# Patient Record
Sex: FEMALE | Race: WHITE | NOT HISPANIC OR LATINO | Employment: FULL TIME | URBAN - METROPOLITAN AREA
[De-identification: names, ages, dates, MRNs, and addresses within clinical notes are randomized per-mention and may not be internally consistent; named-entity substitution may affect disease eponyms.]

---

## 2017-01-19 ENCOUNTER — HOSPITAL ENCOUNTER (EMERGENCY)
Facility: HOSPITAL | Age: 67
Discharge: HOME | End: 2017-01-19
Attending: EMERGENCY MEDICINE | Admitting: EMERGENCY MEDICINE

## 2017-01-19 ENCOUNTER — APPOINTMENT (OUTPATIENT)
Dept: GENERAL RADIOLOGY | Facility: HOSPITAL | Age: 67
End: 2017-01-19

## 2017-01-19 VITALS
SYSTOLIC BLOOD PRESSURE: 125 MMHG | DIASTOLIC BLOOD PRESSURE: 73 MMHG | HEART RATE: 71 BPM | HEIGHT: 67 IN | OXYGEN SATURATION: 94 % | TEMPERATURE: 98.1 F | BODY MASS INDEX: 25.58 KG/M2 | WEIGHT: 163 LBS | RESPIRATION RATE: 18 BRPM

## 2017-01-19 DIAGNOSIS — I25.10 CORONARY ARTERY DISEASE INVOLVING NATIVE CORONARY ARTERY OF NATIVE HEART WITHOUT ANGINA PECTORIS: ICD-10-CM

## 2017-01-19 DIAGNOSIS — M54.6 ACUTE LEFT-SIDED THORACIC BACK PAIN: Primary | ICD-10-CM

## 2017-01-19 LAB
ALBUMIN SERPL-MCNC: 4.1 G/DL (ref 3.2–4.8)
ALBUMIN/GLOB SERPL: 1.7 G/DL (ref 1.5–2.5)
ALP SERPL-CCNC: 72 U/L (ref 25–100)
ALT SERPL W P-5'-P-CCNC: 15 U/L (ref 7–40)
ANION GAP SERPL CALCULATED.3IONS-SCNC: 9 MMOL/L (ref 3–11)
AST SERPL-CCNC: 24 U/L (ref 0–33)
BASOPHILS # BLD AUTO: 0.02 10*3/MM3 (ref 0–0.2)
BASOPHILS NFR BLD AUTO: 0.3 % (ref 0–1)
BILIRUB SERPL-MCNC: 0.3 MG/DL (ref 0.3–1.2)
BNP SERPL-MCNC: 44 PG/ML (ref 0–100)
BUN BLD-MCNC: 11 MG/DL (ref 9–23)
BUN/CREAT SERPL: 13.8 (ref 7–25)
CALCIUM SPEC-SCNC: 9.8 MG/DL (ref 8.7–10.4)
CHLORIDE SERPL-SCNC: 106 MMOL/L (ref 99–109)
CO2 SERPL-SCNC: 29 MMOL/L (ref 20–31)
CREAT BLD-MCNC: 0.8 MG/DL (ref 0.6–1.3)
DEPRECATED RDW RBC AUTO: 48.6 FL (ref 37–54)
EOSINOPHIL # BLD AUTO: 0.11 10*3/MM3 (ref 0.1–0.3)
EOSINOPHIL NFR BLD AUTO: 1.5 % (ref 0–3)
ERYTHROCYTE [DISTWIDTH] IN BLOOD BY AUTOMATED COUNT: 13.7 % (ref 11.3–14.5)
GFR SERPL CREATININE-BSD FRML MDRD: 72 ML/MIN/1.73
GLOBULIN UR ELPH-MCNC: 2.4 GM/DL
GLUCOSE BLD-MCNC: 94 MG/DL (ref 70–100)
HCT VFR BLD AUTO: 38.4 % (ref 34.5–44)
HGB BLD-MCNC: 12.8 G/DL (ref 11.5–15.5)
HOLD SPECIMEN: NORMAL
HOLD SPECIMEN: NORMAL
IMM GRANULOCYTES # BLD: 0.02 10*3/MM3 (ref 0–0.03)
IMM GRANULOCYTES NFR BLD: 0.3 % (ref 0–0.6)
LIPASE SERPL-CCNC: 23 U/L (ref 6–51)
LYMPHOCYTES # BLD AUTO: 2.42 10*3/MM3 (ref 0.6–4.8)
LYMPHOCYTES NFR BLD AUTO: 32.5 % (ref 24–44)
MCH RBC QN AUTO: 32.9 PG (ref 27–31)
MCHC RBC AUTO-ENTMCNC: 33.3 G/DL (ref 32–36)
MCV RBC AUTO: 98.7 FL (ref 80–99)
MONOCYTES # BLD AUTO: 0.31 10*3/MM3 (ref 0–1)
MONOCYTES NFR BLD AUTO: 4.2 % (ref 0–12)
NEUTROPHILS # BLD AUTO: 4.57 10*3/MM3 (ref 1.5–8.3)
NEUTROPHILS NFR BLD AUTO: 61.2 % (ref 41–71)
PLATELET # BLD AUTO: 193 10*3/MM3 (ref 150–450)
PMV BLD AUTO: 10.7 FL (ref 6–12)
POTASSIUM BLD-SCNC: 4.4 MMOL/L (ref 3.5–5.5)
PROT SERPL-MCNC: 6.5 G/DL (ref 5.7–8.2)
RBC # BLD AUTO: 3.89 10*6/MM3 (ref 3.89–5.14)
SODIUM BLD-SCNC: 144 MMOL/L (ref 132–146)
TROPONIN I SERPL-MCNC: 0 NG/ML (ref 0–0.07)
TROPONIN I SERPL-MCNC: 0 NG/ML (ref 0–0.07)
WBC NRBC COR # BLD: 7.45 10*3/MM3 (ref 3.5–10.8)
WHOLE BLOOD HOLD SPECIMEN: NORMAL
WHOLE BLOOD HOLD SPECIMEN: NORMAL

## 2017-01-19 PROCEDURE — 99285 EMERGENCY DEPT VISIT HI MDM: CPT

## 2017-01-19 PROCEDURE — 85025 COMPLETE CBC W/AUTO DIFF WBC: CPT | Performed by: EMERGENCY MEDICINE

## 2017-01-19 PROCEDURE — 36415 COLL VENOUS BLD VENIPUNCTURE: CPT

## 2017-01-19 PROCEDURE — 71010 HC CHEST PA OR AP: CPT

## 2017-01-19 PROCEDURE — 83880 ASSAY OF NATRIURETIC PEPTIDE: CPT | Performed by: EMERGENCY MEDICINE

## 2017-01-19 PROCEDURE — 83690 ASSAY OF LIPASE: CPT | Performed by: EMERGENCY MEDICINE

## 2017-01-19 PROCEDURE — 80053 COMPREHEN METABOLIC PANEL: CPT | Performed by: EMERGENCY MEDICINE

## 2017-01-19 PROCEDURE — 93005 ELECTROCARDIOGRAM TRACING: CPT | Performed by: EMERGENCY MEDICINE

## 2017-01-19 PROCEDURE — 84484 ASSAY OF TROPONIN QUANT: CPT

## 2017-01-19 RX ORDER — DICYCLOMINE HCL 20 MG
20 TABLET ORAL EVERY 6 HOURS
COMMUNITY
End: 2018-06-24

## 2017-01-19 RX ORDER — ATORVASTATIN CALCIUM 40 MG/1
TABLET, FILM COATED ORAL
COMMUNITY
Start: 2015-03-06 | End: 2018-03-28 | Stop reason: SDUPTHER

## 2017-01-19 RX ORDER — METAXALONE 800 MG/1
800 TABLET ORAL DAILY
COMMUNITY
End: 2019-04-10

## 2017-01-19 RX ORDER — ASPIRIN 81 MG/1
324 TABLET, CHEWABLE ORAL ONCE
Status: DISCONTINUED | OUTPATIENT
Start: 2017-01-19 | End: 2017-01-20 | Stop reason: HOSPADM

## 2017-01-19 RX ORDER — RANITIDINE 150 MG/1
150 TABLET ORAL 2 TIMES DAILY
COMMUNITY
End: 2018-06-24

## 2017-01-19 RX ORDER — SODIUM CHLORIDE 0.9 % (FLUSH) 0.9 %
10 SYRINGE (ML) INJECTION AS NEEDED
Status: DISCONTINUED | OUTPATIENT
Start: 2017-01-19 | End: 2017-01-20 | Stop reason: HOSPADM

## 2017-01-19 RX ORDER — CHLORAL HYDRATE 500 MG
CAPSULE ORAL
COMMUNITY
End: 2018-03-28 | Stop reason: SDUPTHER

## 2017-01-19 RX ORDER — ASPIRIN 81 MG/1
81 TABLET ORAL DAILY
COMMUNITY

## 2017-01-19 RX ORDER — PHENOL 1.4 %
600 AEROSOL, SPRAY (ML) MUCOUS MEMBRANE DAILY
COMMUNITY
End: 2020-04-09

## 2017-01-19 RX ORDER — OMEPRAZOLE 40 MG/1
40 CAPSULE, DELAYED RELEASE ORAL DAILY
COMMUNITY
End: 2018-06-24

## 2017-01-19 NOTE — ED PROVIDER NOTES
Subjective   HPI Comments: Cierra López is a 67 y.o. female w/hx MI presenting to the ED with c/o chest pain. Mrs. López reports that she had a sudden onset of left sided chest pain, radiating into the back and her left shoulder, at approximately 1530 this afternoon. She also reports feeling heavy palpitations with the onset, which she believes to be PVCs. The pain came on after she bent down to pick something up. She became lightheaded shortly after, prompting her presentation to the ED. She notes feeling warm during the episode, but had no sweats. She was given nitroglycerin and an aspirin by EMS which improved the pain. Denies any nausea, shortness of breath, fever, dysuria, or chills. No other complaints at this time.     Patient is a 67 y.o. female presenting with chest pain.   History provided by:  Patient and relative  Chest Pain   Pain location:  L chest  Pain quality: tightness    Pain radiates to:  L shoulder and upper back  Pain severity:  Moderate  Onset quality:  Sudden  Duration:  3 hours  Timing:  Constant  Progression:  Improving  Chronicity:  Recurrent  Relieved by:  Aspirin and nitroglycerin  Worsened by:  Nothing  Ineffective treatments:  None tried  Associated symptoms: back pain and palpitations    Associated symptoms: no abdominal pain, no cough, no diaphoresis, no dizziness, no fatigue, no fever, no headache, no lower extremity edema, no nausea, no numbness, no shortness of breath, no vomiting and no weakness    Risk factors: coronary artery disease        Review of Systems   Constitutional: Negative for chills, diaphoresis, fatigue and fever.   Respiratory: Negative for cough and shortness of breath.    Cardiovascular: Positive for chest pain and palpitations.   Gastrointestinal: Negative for abdominal pain, nausea and vomiting.   Genitourinary: Negative for dysuria.   Musculoskeletal: Positive for back pain.   Neurological: Positive for light-headedness. Negative for dizziness,  weakness, numbness and headaches.   All other systems reviewed and are negative.      Past Medical History   Diagnosis Date   • CAD (coronary artery disease)    • Depression    • Diverticulitis      S/P Left hemicolectomy   • Dyslipidemia    • Fatigue      daytime somnolence   • GERD (gastroesophageal reflux disease)    • Hypertension    • Palpitations      Recurrent palpitations consistent with premature atrial contractions/premature ventricular contractions. On beta blocker therapy.   • Supraventricular tachycardia      converted with adenosine       No Known Allergies    Past Surgical History   Procedure Laterality Date   • Hemicolectomy Left    • Tonsillectomy     • Appendectomy     • Hysterectomy     • Breast surgery Left      lumpectomy   • Other surgical history       bladder surgery       History reviewed. No pertinent family history.    Social History     Social History   • Marital status:      Spouse name: N/A   • Number of children: N/A   • Years of education: N/A     Social History Main Topics   • Smoking status: Former Smoker     Quit date: 2014   • Smokeless tobacco: None   • Alcohol use No   • Drug use: No   • Sexual activity: Not Asked     Other Topics Concern   • None     Social History Narrative   • None         Objective   Physical Exam   Constitutional: She is oriented to person, place, and time. She appears well-developed and well-nourished. No distress.   HENT:   Head: Normocephalic and atraumatic.   Mouth/Throat: Oropharynx is clear and moist.   Airway is patent.   Eyes: Conjunctivae and EOM are normal.   Neck: Normal range of motion. Neck supple. No JVD present.   Cardiovascular: Normal rate, regular rhythm, normal heart sounds and intact distal pulses.    Pulmonary/Chest: Effort normal and breath sounds normal. She exhibits no tenderness.   Abdominal: Soft. There is no tenderness.   Musculoskeletal: Normal range of motion. She exhibits edema (1+ pretibial left, trace pretibial  right). She exhibits no tenderness (No tenderness in the spine or paraspinal muscles).   Lymphadenopathy:     She has no cervical adenopathy.   Neurological: She is alert and oriented to person, place, and time.   Skin: Skin is warm and dry. No erythema.   Psychiatric: She has a normal mood and affect. Her behavior is normal.   Nursing note and vitals reviewed.      Procedures         ED Course  ED Course                     MDM    Final diagnoses:   Acute left-sided thoracic back pain   Coronary artery disease involving native coronary artery of native heart without angina pectoris       Documentation assistance provided by liam Ramirez.  Information recorded by the scribe was done at my direction and has been verified and validated by me.     Eric Ramirez  01/19/17 [id number]       Kevin Mancera MD  01/19/17 [id number]

## 2017-01-20 NOTE — DISCHARGE INSTRUCTIONS
Activity as desired and tolerated.  Get rechecked promptly if you start having symptoms with exertion that go away with rest, as that is your heart until proven otherwise.

## 2017-03-22 ENCOUNTER — OFFICE VISIT (OUTPATIENT)
Dept: CARDIOLOGY | Facility: CLINIC | Age: 67
End: 2017-03-22

## 2017-03-22 VITALS
DIASTOLIC BLOOD PRESSURE: 62 MMHG | HEART RATE: 84 BPM | WEIGHT: 162.6 LBS | SYSTOLIC BLOOD PRESSURE: 120 MMHG | BODY MASS INDEX: 25.52 KG/M2 | HEIGHT: 67 IN

## 2017-03-22 DIAGNOSIS — I25.10 CORONARY ARTERY DISEASE INVOLVING NATIVE CORONARY ARTERY OF NATIVE HEART WITHOUT ANGINA PECTORIS: Primary | ICD-10-CM

## 2017-03-22 DIAGNOSIS — I10 ESSENTIAL HYPERTENSION: ICD-10-CM

## 2017-03-22 DIAGNOSIS — R00.2 PALPITATIONS: ICD-10-CM

## 2017-03-22 DIAGNOSIS — E78.5 DYSLIPIDEMIA: ICD-10-CM

## 2017-03-22 DIAGNOSIS — I47.1 SUPRAVENTRICULAR TACHYCARDIA (HCC): ICD-10-CM

## 2017-03-22 PROCEDURE — 99213 OFFICE O/P EST LOW 20 MIN: CPT | Performed by: INTERNAL MEDICINE

## 2017-03-22 NOTE — PROGRESS NOTES
"Subjective:     Encounter Date:03/22/2017      Patient ID: Cierra López is a 67 y.o. female.    Chief Complaint: Hypertension; Follow-up; and Coronary Artery Disease      PROBLEM LIST:  1.  Coronary artery disease:  a. Non-ST-elevation myocardial infarction,  2013, felt secondary to SVT.  Subsequent cardiac catheterization with 20% circumflex, 40% mid RCA, distal 50% RCA, 20% LAD.  Normal LVEF.  b. July 2015, normal myocardial perfusion study.   2. History of supraventricular tachycardia, converted with adenosine.  3. Hypertension.  4. Dyslipidemia.  5. Gastroesophageal reflux disease.  6. Depression.  7.  Remote tobacco abuse with cessation in 2014.  8. Diverticulitis:  a.  Status post left hemicolectomy.  9. Tonsillectomy.  10. Appendectomy.  11.  Hysterectomy.  12. Left lumpectomy.  13. Remote bladder surgery    History of Present Illness  Patient returns today for follow up with a history of coronary artery disease.  Since her last visit, she had an emergency room visit when she developed severe left-sided and left back pain after rising from a stooping position.  This is of hypertension and PVCs.  She had a negative evaluation in the emergency room.  Her discomfort has not come back, and her hypertension has been well controlled since.  She does note occasional PVCs, some of which she does notices by feeling her pulse, and Soma which she feels with \"pounding in my neck\".  She does these occur intermittently, often made better when she takes a small amount of Xanax that she has at home.  She is a no syncope presyncope.  She is still very active working, has no difficulty walking in the parking lot at [facility], in no difficulty with exertion..     No Known Allergies      Current Outpatient Prescriptions:   •  aspirin 81 MG EC tablet, Take 81 mg by mouth Daily., Disp: , Rfl:   •  atorvastatin (LIPITOR) 40 MG tablet, Take  by mouth., Disp: , Rfl:   •  calcium carbonate (OS-MARK) 600 MG tablet, Take 600 mg by mouth " "Daily., Disp: , Rfl:   •  dicyclomine (BENTYL) 20 MG tablet, Take 20 mg by mouth Every 6 (Six) Hours., Disp: , Rfl:   •  metaxalone (SKELAXIN) 800 MG tablet, Take 800 mg by mouth 3 (Three) Times a Day As Needed for muscle spasms., Disp: , Rfl:   •  metoprolol succinate XL (TOPROL-XL) 25 MG 24 hr tablet, Take 1 tablet by mouth daily., Disp: 90 tablet, Rfl: 2  •  Multiple Vitamins-Minerals (MULTIVITAMIN ADULT PO), Take 1 tablet by mouth Daily., Disp: , Rfl:   •  Omega-3 Fatty Acids (FISH OIL) 1000 MG capsule capsule, Take  by mouth Daily With Breakfast., Disp: , Rfl:   •  omeprazole (priLOSEC) 40 MG capsule, Take 40 mg by mouth Daily., Disp: , Rfl:   •  raNITIdine (ZANTAC) 150 MG tablet, Take 150 mg by mouth 2 (Two) Times a Day., Disp: , Rfl:     The following portions of the patient's history were reviewed and updated as appropriate: allergies, current medications, past family history, past medical history, past social history, past surgical history and problem list.    Review of Systems   Constitution: Positive for malaise/fatigue.   Cardiovascular: Positive for palpitations.   Respiratory: Negative.    Hematologic/Lymphatic: Negative for bleeding problem. Does not bruise/bleed easily.   Skin: Negative for rash.   Musculoskeletal: Positive for back pain. Negative for muscle weakness and myalgias.   Gastrointestinal: Negative for heartburn, nausea and vomiting.   Neurological: Negative.           Objective:   Blood pressure 120/62, pulse 84, height 67\" (170.2 cm), weight 162 lb 9.6 oz (73.8 kg).      Physical Exam   Constitutional: She is oriented to person, place, and time. She appears well-developed and well-nourished.   HENT:   Mouth/Throat: Oropharynx is clear and moist.   Neck: No JVD present. Carotid bruit is not present. No thyromegaly present.   Cardiovascular: Regular rhythm, S1 normal, S2 normal, normal heart sounds and intact distal pulses.  Exam reveals no gallop, no S3 and no S4.    No murmur " "heard.  Pulses:       Carotid pulses are 2+ on the right side, and 2+ on the left side.       Radial pulses are 2+ on the right side, and 2+ on the left side.   Pulmonary/Chest: Breath sounds normal.   Abdominal: Soft. Bowel sounds are normal. She exhibits no mass. There is no tenderness.   Musculoskeletal: She exhibits no edema.   Neurological: She is alert and oriented to person, place, and time.   Skin: Skin is warm and dry. No rash noted.       Lab Review:    Procedures        Assessment:   Cierra was seen today for hypertension, follow-up and coronary artery disease.    Diagnoses and all orders for this visit:    Coronary artery disease involving native coronary artery of native heart without angina pectoris    Supraventricular tachycardia    Essential hypertension    Palpitations    Dyslipidemia      Impression  1. Coronary artery disease, stable without angina normal stress test less than 2 years ago  2. Palpitations, probable PVCs.  Discussed benign prognosis.  3. Hypertension controlled  4. History of SVT, no recurrence  5. Dyslipidemia on high-dose statin    Plan:  1. At this time and no change in her medical therapy.  Has recurrent episodes of \"heart pounding and throat\", she will notify us to consider monitor to capture these episodes, 2 rule out recurrent SVT.  Nonetheless her symptoms seem classic for PVCs the way she describes them.  2. Revisit in 12MO, or sooner as needed.    Cristi Jacobs MD    "

## 2017-05-26 DIAGNOSIS — Z00.00 HEALTHCARE MAINTENANCE: ICD-10-CM

## 2017-05-26 RX ORDER — METOPROLOL SUCCINATE 25 MG/1
25 TABLET, EXTENDED RELEASE ORAL DAILY
Qty: 90 TABLET | Refills: 2 | Status: SHIPPED | OUTPATIENT
Start: 2017-05-26 | End: 2018-03-05 | Stop reason: SDUPTHER

## 2018-03-05 DIAGNOSIS — Z00.00 HEALTHCARE MAINTENANCE: ICD-10-CM

## 2018-03-05 RX ORDER — METOPROLOL SUCCINATE 25 MG/1
25 TABLET, EXTENDED RELEASE ORAL DAILY
Qty: 90 TABLET | Refills: 2 | Status: SHIPPED | OUTPATIENT
Start: 2018-03-05 | End: 2018-03-28 | Stop reason: SDUPTHER

## 2018-03-26 NOTE — PROGRESS NOTES
Subjective:     Encounter Date:03/28/2018    Primary Care Physician: Cristi Lopez MD      Patient ID: Cierra López is a 68 y.o. female.    Chief Complaint:No chief complaint on file.    PROBLEM LIST:  1.  Coronary artery disease:  a. Non-ST-elevation myocardial infarction,  2013, felt secondary to SVT.  Subsequent cardiac catheterization with 20% circumflex, 40% mid RCA, distal 50% RCA, 20% LAD.  Normal LVEF.  b. July 2015, normal myocardial perfusion study.   2. History of supraventricular tachycardia, converted with adenosine.  3. Hypertension.  4. Dyslipidemia.  5. Gastroesophageal reflux disease.  6. Depression.  7.  Remote tobacco abuse with cessation in 2014.  8. Diverticulitis:  a.  Status post left hemicolectomy.  9. Tonsillectomy.  10. Appendectomy.  11.  Hysterectomy.  12. Left lumpectomy.  13. Remote bladder surgery    No Known Allergies      Current Outpatient Prescriptions:   •  aspirin 81 MG EC tablet, Take 81 mg by mouth Daily., Disp: , Rfl:   •  atorvastatin (LIPITOR) 40 MG tablet, Take  by mouth., Disp: , Rfl:   •  calcium carbonate (OS-MARK) 600 MG tablet, Take 600 mg by mouth Daily., Disp: , Rfl:   •  dicyclomine (BENTYL) 20 MG tablet, Take 20 mg by mouth Every 6 (Six) Hours., Disp: , Rfl:   •  metaxalone (SKELAXIN) 800 MG tablet, Take 800 mg by mouth 3 (Three) Times a Day As Needed for muscle spasms., Disp: , Rfl:   •  metoprolol succinate XL (TOPROL-XL) 25 MG 24 hr tablet, Take 1 tablet by mouth Daily., Disp: 90 tablet, Rfl: 2  •  Multiple Vitamins-Minerals (MULTIVITAMIN ADULT PO), Take 1 tablet by mouth Daily., Disp: , Rfl:   •  Omega-3 Fatty Acids (FISH OIL) 1000 MG capsule capsule, Take  by mouth Daily With Breakfast., Disp: , Rfl:   •  omeprazole (priLOSEC) 40 MG capsule, Take 40 mg by mouth Daily., Disp: , Rfl:   •  raNITIdine (ZANTAC) 150 MG tablet, Take 150 mg by mouth 2 (Two) Times a Day., Disp: , Rfl:         History of Present Illness    Patient returns today for annual  follow-up coronary disease.  Since her last visit she is overall doing very well.  She still working at planning on retiring next year.  No exertional dyspnea or chest pain.  No longer feels the palpitations.  Revision for copy of her living will which was scanned to chart later today.      The following portions of the patient's history were reviewed and updated as appropriate: allergies, current medications, past family history, past medical history, past social history, past surgical history and problem list.      Social History   Substance Use Topics   • Smoking status: Former Smoker     Quit date: 2014   • Smokeless tobacco: Not on file   • Alcohol use No         Review of Systems   Constitution: Positive for malaise/fatigue.   Cardiovascular: Positive for palpitations.   Respiratory: Negative.    Hematologic/Lymphatic: Negative for bleeding problem. Does not bruise/bleed easily.   Skin: Negative for rash.   Musculoskeletal: Positive for back pain. Negative for muscle weakness and myalgias.   Gastrointestinal: Negative for heartburn, nausea and vomiting.   Neurological: Negative.           Objective:    vitals were not taken for this visit.        Physical Exam   Constitutional: She is oriented to person, place, and time. She appears well-developed and well-nourished.   HENT:   Mouth/Throat: Oropharynx is clear and moist.   Neck: No JVD present. Carotid bruit is not present. No thyromegaly present.   Cardiovascular: Regular rhythm, S1 normal, S2 normal, normal heart sounds and intact distal pulses.  Exam reveals no gallop, no S3 and no S4.    No murmur heard.  Pulses:       Carotid pulses are 2+ on the right side, and 2+ on the left side.       Radial pulses are 2+ on the right side, and 2+ on the left side.   Pulmonary/Chest: Breath sounds normal.   Abdominal: Soft. Bowel sounds are normal. She exhibits no mass. There is no tenderness.   Musculoskeletal: She exhibits no edema.   Neurological: She is alert and  oriented to person, place, and time.   Skin: Skin is warm and dry. No rash noted.       Procedures          Assessment:   Assessment/Plan      Cierra was seen today for coronary artery disease.    Diagnoses and all orders for this visit:    Coronary artery disease involving native coronary artery of native heart without angina pectoris    Supraventricular tachycardia    Essential hypertension    Dyslipidemia      1.  Coronary artery disease stable without angina  2.  History of SVT, no recurrent events on beta blocker.  3.  Hypertension controlled  4.  Dyslipidemia on high-dose statin.  Recommendations continue current medical therapy.  Revisit annually or when necessary symptom change.     Cristi Jacobs MD      Dictated utilizing Dragon dictation

## 2018-03-28 ENCOUNTER — OFFICE VISIT (OUTPATIENT)
Dept: CARDIOLOGY | Facility: CLINIC | Age: 68
End: 2018-03-28

## 2018-03-28 VITALS
WEIGHT: 151 LBS | BODY MASS INDEX: 24.27 KG/M2 | DIASTOLIC BLOOD PRESSURE: 76 MMHG | HEART RATE: 77 BPM | HEIGHT: 66 IN | SYSTOLIC BLOOD PRESSURE: 132 MMHG

## 2018-03-28 DIAGNOSIS — Z00.00 HEALTHCARE MAINTENANCE: ICD-10-CM

## 2018-03-28 DIAGNOSIS — I47.1 SUPRAVENTRICULAR TACHYCARDIA (HCC): ICD-10-CM

## 2018-03-28 DIAGNOSIS — E78.5 DYSLIPIDEMIA: ICD-10-CM

## 2018-03-28 DIAGNOSIS — I10 ESSENTIAL HYPERTENSION: ICD-10-CM

## 2018-03-28 DIAGNOSIS — I25.10 CORONARY ARTERY DISEASE INVOLVING NATIVE CORONARY ARTERY OF NATIVE HEART WITHOUT ANGINA PECTORIS: Primary | ICD-10-CM

## 2018-03-28 PROCEDURE — 99213 OFFICE O/P EST LOW 20 MIN: CPT | Performed by: INTERNAL MEDICINE

## 2018-03-28 RX ORDER — METOPROLOL SUCCINATE 25 MG/1
25 TABLET, EXTENDED RELEASE ORAL DAILY
Qty: 90 TABLET | Refills: 3 | Status: SHIPPED | OUTPATIENT
Start: 2018-03-28 | End: 2018-12-04 | Stop reason: SDUPTHER

## 2018-03-28 RX ORDER — CHLORAL HYDRATE 500 MG
1000 CAPSULE ORAL
Qty: 90 CAPSULE | Refills: 3 | Status: SHIPPED | OUTPATIENT
Start: 2018-03-28 | End: 2020-04-09 | Stop reason: SDUPTHER

## 2018-03-28 RX ORDER — ATORVASTATIN CALCIUM 40 MG/1
40 TABLET, FILM COATED ORAL NIGHTLY
Qty: 90 TABLET | Refills: 3 | Status: SHIPPED | OUTPATIENT
Start: 2018-03-28 | End: 2020-04-09 | Stop reason: SDUPTHER

## 2018-06-24 ENCOUNTER — APPOINTMENT (OUTPATIENT)
Dept: GENERAL RADIOLOGY | Facility: HOSPITAL | Age: 68
End: 2018-06-24

## 2018-06-24 ENCOUNTER — HOSPITAL ENCOUNTER (OUTPATIENT)
Facility: HOSPITAL | Age: 68
Setting detail: OBSERVATION
Discharge: HOME | End: 2018-06-26
Attending: EMERGENCY MEDICINE | Admitting: INTERNAL MEDICINE

## 2018-06-24 DIAGNOSIS — Z86.79 PERSONAL HISTORY OF CORONARY ARTERY DISEASE: ICD-10-CM

## 2018-06-24 DIAGNOSIS — R00.2 HEART PALPITATIONS: ICD-10-CM

## 2018-06-24 DIAGNOSIS — I10 ELEVATED BLOOD PRESSURE READING WITH DIAGNOSIS OF HYPERTENSION: ICD-10-CM

## 2018-06-24 DIAGNOSIS — I20.0 UNSTABLE ANGINA PECTORIS (HCC): Primary | ICD-10-CM

## 2018-06-24 DIAGNOSIS — I49.3 FREQUENT PVCS: ICD-10-CM

## 2018-06-24 DIAGNOSIS — E78.5 HYPERLIPIDEMIA, UNSPECIFIED HYPERLIPIDEMIA TYPE: ICD-10-CM

## 2018-06-24 LAB
ALBUMIN SERPL-MCNC: 4.5 G/DL (ref 3.2–4.8)
ALBUMIN/GLOB SERPL: 1.7 G/DL (ref 1.5–2.5)
ALP SERPL-CCNC: 78 U/L (ref 25–100)
ALT SERPL W P-5'-P-CCNC: 14 U/L (ref 7–40)
ANION GAP SERPL CALCULATED.3IONS-SCNC: 4 MMOL/L (ref 3–11)
APTT PPP: 28.7 SECONDS (ref 55–70)
AST SERPL-CCNC: 21 U/L (ref 0–33)
BASOPHILS # BLD AUTO: 0.03 10*3/MM3 (ref 0–0.2)
BASOPHILS NFR BLD AUTO: 0.3 % (ref 0–1)
BILIRUB SERPL-MCNC: 0.5 MG/DL (ref 0.3–1.2)
BNP SERPL-MCNC: 81 PG/ML (ref 0–100)
BUN BLD-MCNC: 10 MG/DL (ref 9–23)
BUN/CREAT SERPL: 11.2 (ref 7–25)
CALCIUM SPEC-SCNC: 9.7 MG/DL (ref 8.7–10.4)
CHLORIDE SERPL-SCNC: 109 MMOL/L (ref 99–109)
CO2 SERPL-SCNC: 30 MMOL/L (ref 20–31)
CREAT BLD-MCNC: 0.89 MG/DL (ref 0.6–1.3)
DEPRECATED RDW RBC AUTO: 47.5 FL (ref 37–54)
EOSINOPHIL # BLD AUTO: 0.18 10*3/MM3 (ref 0–0.3)
EOSINOPHIL NFR BLD AUTO: 1.6 % (ref 0–3)
ERYTHROCYTE [DISTWIDTH] IN BLOOD BY AUTOMATED COUNT: 13.1 % (ref 11.3–14.5)
GFR SERPL CREATININE-BSD FRML MDRD: 63 ML/MIN/1.73
GLOBULIN UR ELPH-MCNC: 2.6 GM/DL
GLUCOSE BLD-MCNC: 108 MG/DL (ref 70–100)
HCT VFR BLD AUTO: 42.4 % (ref 34.5–44)
HGB BLD-MCNC: 13.8 G/DL (ref 11.5–15.5)
HOLD SPECIMEN: NORMAL
HOLD SPECIMEN: NORMAL
IMM GRANULOCYTES # BLD: 0.03 10*3/MM3 (ref 0–0.03)
IMM GRANULOCYTES NFR BLD: 0.3 % (ref 0–0.6)
INR PPP: 0.94 (ref 0.91–1.09)
LIPASE SERPL-CCNC: 31 U/L (ref 6–51)
LYMPHOCYTES # BLD AUTO: 3.41 10*3/MM3 (ref 0.6–4.8)
LYMPHOCYTES NFR BLD AUTO: 30.8 % (ref 24–44)
MCH RBC QN AUTO: 31.9 PG (ref 27–31)
MCHC RBC AUTO-ENTMCNC: 32.5 G/DL (ref 32–36)
MCV RBC AUTO: 97.9 FL (ref 80–99)
MONOCYTES # BLD AUTO: 0.54 10*3/MM3 (ref 0–1)
MONOCYTES NFR BLD AUTO: 4.9 % (ref 0–12)
NEUTROPHILS # BLD AUTO: 6.87 10*3/MM3 (ref 1.5–8.3)
NEUTROPHILS NFR BLD AUTO: 62.1 % (ref 41–71)
PLATELET # BLD AUTO: 282 10*3/MM3 (ref 150–450)
PMV BLD AUTO: 10.5 FL (ref 6–12)
POTASSIUM BLD-SCNC: 4.2 MMOL/L (ref 3.5–5.5)
PROT SERPL-MCNC: 7.1 G/DL (ref 5.7–8.2)
PROTHROMBIN TIME: 9.9 SECONDS (ref 9.6–11.5)
RBC # BLD AUTO: 4.33 10*6/MM3 (ref 3.89–5.14)
SODIUM BLD-SCNC: 143 MMOL/L (ref 132–146)
TROPONIN I SERPL-MCNC: 0 NG/ML (ref 0–0.07)
WBC NRBC COR # BLD: 11.06 10*3/MM3 (ref 3.5–10.8)
WHOLE BLOOD HOLD SPECIMEN: NORMAL
WHOLE BLOOD HOLD SPECIMEN: NORMAL

## 2018-06-24 PROCEDURE — 85610 PROTHROMBIN TIME: CPT | Performed by: EMERGENCY MEDICINE

## 2018-06-24 PROCEDURE — 96368 THER/DIAG CONCURRENT INF: CPT

## 2018-06-24 PROCEDURE — 84484 ASSAY OF TROPONIN QUANT: CPT

## 2018-06-24 PROCEDURE — 80053 COMPREHEN METABOLIC PANEL: CPT | Performed by: EMERGENCY MEDICINE

## 2018-06-24 PROCEDURE — 99285 EMERGENCY DEPT VISIT HI MDM: CPT

## 2018-06-24 PROCEDURE — 25010000002 HEPARIN (PORCINE) PER 1000 UNITS: Performed by: NURSE PRACTITIONER

## 2018-06-24 PROCEDURE — 83880 ASSAY OF NATRIURETIC PEPTIDE: CPT | Performed by: EMERGENCY MEDICINE

## 2018-06-24 PROCEDURE — 85025 COMPLETE CBC W/AUTO DIFF WBC: CPT | Performed by: EMERGENCY MEDICINE

## 2018-06-24 PROCEDURE — 71045 X-RAY EXAM CHEST 1 VIEW: CPT

## 2018-06-24 PROCEDURE — 96366 THER/PROPH/DIAG IV INF ADDON: CPT

## 2018-06-24 PROCEDURE — 85730 THROMBOPLASTIN TIME PARTIAL: CPT | Performed by: EMERGENCY MEDICINE

## 2018-06-24 PROCEDURE — 99220 PR INITIAL OBSERVATION CARE/DAY 70 MINUTES: CPT | Performed by: INTERNAL MEDICINE

## 2018-06-24 PROCEDURE — 96376 TX/PRO/DX INJ SAME DRUG ADON: CPT

## 2018-06-24 PROCEDURE — 93005 ELECTROCARDIOGRAM TRACING: CPT | Performed by: EMERGENCY MEDICINE

## 2018-06-24 PROCEDURE — G0378 HOSPITAL OBSERVATION PER HR: HCPCS

## 2018-06-24 PROCEDURE — 83690 ASSAY OF LIPASE: CPT | Performed by: EMERGENCY MEDICINE

## 2018-06-24 PROCEDURE — 96374 THER/PROPH/DIAG INJ IV PUSH: CPT

## 2018-06-24 PROCEDURE — 96365 THER/PROPH/DIAG IV INF INIT: CPT

## 2018-06-24 PROCEDURE — 25010000002 HEPARIN (PORCINE) PER 1000 UNITS: Performed by: EMERGENCY MEDICINE

## 2018-06-24 RX ORDER — ASPIRIN 81 MG/1
324 TABLET, CHEWABLE ORAL ONCE
Status: COMPLETED | OUTPATIENT
Start: 2018-06-24 | End: 2018-06-24

## 2018-06-24 RX ORDER — ATORVASTATIN CALCIUM 40 MG/1
40 TABLET, FILM COATED ORAL NIGHTLY
Status: DISCONTINUED | OUTPATIENT
Start: 2018-06-24 | End: 2018-06-26 | Stop reason: HOSPADM

## 2018-06-24 RX ORDER — ACETAMINOPHEN 325 MG/1
650 TABLET ORAL EVERY 4 HOURS PRN
Status: DISCONTINUED | OUTPATIENT
Start: 2018-06-24 | End: 2018-06-26 | Stop reason: HOSPADM

## 2018-06-24 RX ORDER — METOPROLOL SUCCINATE 25 MG/1
25 TABLET, EXTENDED RELEASE ORAL DAILY
Status: DISCONTINUED | OUTPATIENT
Start: 2018-06-24 | End: 2018-06-24

## 2018-06-24 RX ORDER — HEPARIN SODIUM 1000 [USP'U]/ML
4000 INJECTION, SOLUTION INTRAVENOUS; SUBCUTANEOUS ONCE
Status: COMPLETED | OUTPATIENT
Start: 2018-06-24 | End: 2018-06-24

## 2018-06-24 RX ORDER — SODIUM CHLORIDE 0.9 % (FLUSH) 0.9 %
10 SYRINGE (ML) INJECTION AS NEEDED
Status: DISCONTINUED | OUTPATIENT
Start: 2018-06-24 | End: 2018-06-26 | Stop reason: HOSPADM

## 2018-06-24 RX ORDER — HEPARIN SODIUM 5000 [USP'U]/ML
5000 INJECTION, SOLUTION INTRAVENOUS; SUBCUTANEOUS EVERY 12 HOURS SCHEDULED
Status: DISCONTINUED | OUTPATIENT
Start: 2018-06-24 | End: 2018-06-26 | Stop reason: HOSPADM

## 2018-06-24 RX ORDER — SODIUM CHLORIDE 0.9 % (FLUSH) 0.9 %
1-10 SYRINGE (ML) INJECTION AS NEEDED
Status: DISCONTINUED | OUTPATIENT
Start: 2018-06-24 | End: 2018-06-26 | Stop reason: HOSPADM

## 2018-06-24 RX ORDER — CLOPIDOGREL BISULFATE 75 MG/1
75 TABLET ORAL DAILY
Status: DISCONTINUED | OUTPATIENT
Start: 2018-06-24 | End: 2018-06-26 | Stop reason: HOSPADM

## 2018-06-24 RX ORDER — METAXALONE 800 MG/1
800 TABLET ORAL DAILY
Status: DISCONTINUED | OUTPATIENT
Start: 2018-06-24 | End: 2018-06-26 | Stop reason: HOSPADM

## 2018-06-24 RX ORDER — MULTIPLE VITAMINS W/ MINERALS TAB 9MG-400MCG
1 TAB ORAL DAILY
Status: DISCONTINUED | OUTPATIENT
Start: 2018-06-24 | End: 2018-06-26 | Stop reason: HOSPADM

## 2018-06-24 RX ORDER — ASPIRIN 81 MG/1
81 TABLET ORAL DAILY
Status: DISCONTINUED | OUTPATIENT
Start: 2018-06-25 | End: 2018-06-26 | Stop reason: HOSPADM

## 2018-06-24 RX ORDER — NITROGLYCERIN 20 MG/100ML
10-50 INJECTION INTRAVENOUS
Status: DISCONTINUED | OUTPATIENT
Start: 2018-06-24 | End: 2018-06-24 | Stop reason: ALTCHOICE

## 2018-06-24 RX ADMIN — HEPARIN SODIUM 12 UNITS/KG/HR: 10000 INJECTION, SOLUTION INTRAVENOUS at 14:42

## 2018-06-24 RX ADMIN — METAXALONE 800 MG: 800 TABLET ORAL at 17:53

## 2018-06-24 RX ADMIN — CLOPIDOGREL BISULFATE 75 MG: 75 TABLET ORAL at 18:06

## 2018-06-24 RX ADMIN — NITROGLYCERIN 10 MCG/MIN: 20 INJECTION INTRAVENOUS at 14:41

## 2018-06-24 RX ADMIN — MULTIPLE VITAMINS W/ MINERALS TAB: TAB ORAL at 17:53

## 2018-06-24 RX ADMIN — HEPARIN SODIUM 5000 UNITS: 5000 INJECTION, SOLUTION INTRAVENOUS; SUBCUTANEOUS at 21:55

## 2018-06-24 RX ADMIN — METOPROLOL SUCCINATE 25 MG: 25 TABLET, EXTENDED RELEASE ORAL at 17:53

## 2018-06-24 RX ADMIN — ASPIRIN 81 MG CHEWABLE TABLET 324 MG: 81 TABLET CHEWABLE at 14:39

## 2018-06-24 RX ADMIN — HEPARIN SODIUM 4000 UNITS: 1000 INJECTION, SOLUTION INTRAVENOUS; SUBCUTANEOUS at 14:42

## 2018-06-25 ENCOUNTER — APPOINTMENT (OUTPATIENT)
Dept: CARDIOLOGY | Facility: HOSPITAL | Age: 68
End: 2018-06-25
Attending: NURSE PRACTITIONER

## 2018-06-25 ENCOUNTER — APPOINTMENT (OUTPATIENT)
Dept: CARDIOLOGY | Facility: HOSPITAL | Age: 68
End: 2018-06-25
Attending: INTERNAL MEDICINE

## 2018-06-25 LAB
BH CV ECHO MEAS - AO MAX PG (FULL): 0.7 MMHG
BH CV ECHO MEAS - AO MAX PG: 5 MMHG
BH CV ECHO MEAS - AO ROOT AREA (BSA CORRECTED): 1.2
BH CV ECHO MEAS - AO ROOT AREA: 6.8 CM^2
BH CV ECHO MEAS - AO ROOT DIAM: 3 CM
BH CV ECHO MEAS - AO V2 MAX: 106.3 CM/SEC
BH CV ECHO MEAS - AVA(V,A): 3.3 CM^2
BH CV ECHO MEAS - AVA(V,D): 3.3 CM^2
BH CV ECHO MEAS - BSA(HAYCOCK): 2.7 M^2
BH CV ECHO MEAS - BSA: 2.5 M^2
BH CV ECHO MEAS - BZI_BMI: 50.2 KILOGRAMS/M^2
BH CV ECHO MEAS - BZI_METRIC_HEIGHT: 170 CM
BH CV ECHO MEAS - BZI_METRIC_WEIGHT: 145 KG
BH CV ECHO MEAS - CONTRAST EF (2CH): 64.8 ML/M^2
BH CV ECHO MEAS - CONTRAST EF 4CH: 68.8 ML/M^2
BH CV ECHO MEAS - EDV(CUBED): 132.4 ML
BH CV ECHO MEAS - EDV(MOD-SP2): 54 ML
BH CV ECHO MEAS - EDV(MOD-SP4): 64 ML
BH CV ECHO MEAS - EDV(TEICH): 123.6 ML
BH CV ECHO MEAS - EF(CUBED): 87.8 %
BH CV ECHO MEAS - EF(MOD-BP): 70 %
BH CV ECHO MEAS - EF(MOD-SP2): 64.8 %
BH CV ECHO MEAS - EF(MOD-SP4): 68.8 %
BH CV ECHO MEAS - EF(TEICH): 81.4 %
BH CV ECHO MEAS - ESV(CUBED): 16.2 ML
BH CV ECHO MEAS - ESV(MOD-SP2): 19 ML
BH CV ECHO MEAS - ESV(MOD-SP4): 20 ML
BH CV ECHO MEAS - ESV(TEICH): 23 ML
BH CV ECHO MEAS - FS: 50.3 %
BH CV ECHO MEAS - IVS/LVPW: 0.71
BH CV ECHO MEAS - IVSD: 0.77 CM
BH CV ECHO MEAS - LA DIMENSION: 3.5 CM
BH CV ECHO MEAS - LA/AO: 1.2
BH CV ECHO MEAS - LV DIASTOLIC VOL/BSA (35-75): 26 ML/M^2
BH CV ECHO MEAS - LV MASS(C)D: 169.3 GRAMS
BH CV ECHO MEAS - LV MASS(C)DI: 68.7 GRAMS/M^2
BH CV ECHO MEAS - LV MAX PG: 4.3 MMHG
BH CV ECHO MEAS - LV SYSTOLIC VOL/BSA (12-30): 8.1 ML/M^2
BH CV ECHO MEAS - LV V1 MAX: 103.7 CM/SEC
BH CV ECHO MEAS - LVIDD: 5.1 CM
BH CV ECHO MEAS - LVIDS: 2.5 CM
BH CV ECHO MEAS - LVLD AP2: 7.2 CM
BH CV ECHO MEAS - LVLD AP4: 6.5 CM
BH CV ECHO MEAS - LVLS AP2: 6 CM
BH CV ECHO MEAS - LVLS AP4: 5.5 CM
BH CV ECHO MEAS - LVOT AREA (M): 3.5 CM^2
BH CV ECHO MEAS - LVOT AREA: 3.3 CM^2
BH CV ECHO MEAS - LVOT DIAM: 2.1 CM
BH CV ECHO MEAS - LVPWD: 1.1 CM
BH CV ECHO MEAS - MV A MAX VEL: 59.7 CM/SEC
BH CV ECHO MEAS - MV DEC TIME: 0.3 SEC
BH CV ECHO MEAS - MV E MAX VEL: 67.1 CM/SEC
BH CV ECHO MEAS - MV E/A: 1.1
BH CV ECHO MEAS - PA ACC SLOPE: 304.5 CM/SEC^2
BH CV ECHO MEAS - PA ACC TIME: 0.25 SEC
BH CV ECHO MEAS - PA MAX PG (FULL): 3.7 MMHG
BH CV ECHO MEAS - PA MAX PG: 5.8 MMHG
BH CV ECHO MEAS - PA PR(ACCEL): -32.6 MMHG
BH CV ECHO MEAS - PA V2 MAX: 120.8 CM/SEC
BH CV ECHO MEAS - PI END-D VEL: 139.8 CM/SEC
BH CV ECHO MEAS - RV MAX PG: 2.2 MMHG
BH CV ECHO MEAS - RV V1 MAX: 73.5 CM/SEC
BH CV ECHO MEAS - SI(CUBED): 47.1 ML/M^2
BH CV ECHO MEAS - SI(MOD-SP2): 14.2 ML/M^2
BH CV ECHO MEAS - SI(MOD-SP4): 17.8 ML/M^2
BH CV ECHO MEAS - SI(TEICH): 40.8 ML/M^2
BH CV ECHO MEAS - SV(CUBED): 116.2 ML
BH CV ECHO MEAS - SV(MOD-SP2): 35 ML
BH CV ECHO MEAS - SV(MOD-SP4): 44 ML
BH CV ECHO MEAS - SV(TEICH): 100.6 ML
BH CV ECHO MEAS - TAPSE (>1.6): 2.9 CM2
BH CV STRESS BP STAGE 1: NORMAL
BH CV STRESS BP STAGE 4: NORMAL
BH CV STRESS COMMENTS STAGE 1: NORMAL
BH CV STRESS DOSE REGADENOSON STAGE 1: 0.4
BH CV STRESS DURATION MIN STAGE 1: 1
BH CV STRESS DURATION MIN STAGE 2: 1
BH CV STRESS DURATION MIN STAGE 3: 1
BH CV STRESS DURATION MIN STAGE 4: 1
BH CV STRESS DURATION SEC STAGE 2: 0
BH CV STRESS HR STAGE 1: 104
BH CV STRESS HR STAGE 2: 105
BH CV STRESS HR STAGE 3: 103
BH CV STRESS HR STAGE 4: 99
BH CV STRESS O2 STAGE 3: 97
BH CV STRESS O2 STAGE 4: 97
BH CV STRESS PROTOCOL 1: NORMAL
BH CV STRESS RECOVERY BP: NORMAL MMHG
BH CV STRESS RECOVERY HR: 90 BPM
BH CV STRESS STAGE 1: 1
BH CV STRESS STAGE 2: 2
BH CV STRESS STAGE 3: 3
BH CV STRESS STAGE 4: 4
BH CV XLRA - RV BASE: 2.5 CM
BH CV XLRA - RV LENGTH: 5.5 CM
BH CV XLRA - RV MID: 1.8 CM
BH CV XLRA - TDI S': 9.87 CM/SEC
LEFT ATRIUM VOLUME INDEX: 17.8 ML/M2
LEFT ATRIUM VOLUME: 44 CM3
LV EF 2D ECHO EST: 65 %
LV EF NUC BP: 60 %
MAXIMAL PREDICTED HEART RATE: 154 BPM
MAXIMAL PREDICTED HEART RATE: 154 BPM
PERCENT MAX PREDICTED HR: 72.73 %
STRESS BASELINE BP: NORMAL MMHG
STRESS BASELINE HR: 74 BPM
STRESS O2 SAT REST: 98 %
STRESS PERCENT HR: 86 %
STRESS POST PEAK BP: NORMAL MMHG
STRESS POST PEAK HR: 112 BPM
STRESS TARGET HR: 131 BPM
STRESS TARGET HR: 131 BPM
TROPONIN I SERPL-MCNC: <0.006 NG/ML

## 2018-06-25 PROCEDURE — 93017 CV STRESS TEST TRACING ONLY: CPT

## 2018-06-25 PROCEDURE — 0 RUBIDIUM CHLORIDE: Performed by: INTERNAL MEDICINE

## 2018-06-25 PROCEDURE — 78492 MYOCRD IMG PET MLT RST&STRS: CPT

## 2018-06-25 PROCEDURE — G0378 HOSPITAL OBSERVATION PER HR: HCPCS

## 2018-06-25 PROCEDURE — 78492 MYOCRD IMG PET MLT RST&STRS: CPT | Performed by: INTERNAL MEDICINE

## 2018-06-25 PROCEDURE — A9555 RB82 RUBIDIUM: HCPCS | Performed by: INTERNAL MEDICINE

## 2018-06-25 PROCEDURE — 99224 PR SBSQ OBSERVATION CARE/DAY 15 MINUTES: CPT | Performed by: INTERNAL MEDICINE

## 2018-06-25 PROCEDURE — 93306 TTE W/DOPPLER COMPLETE: CPT

## 2018-06-25 PROCEDURE — 93306 TTE W/DOPPLER COMPLETE: CPT | Performed by: INTERNAL MEDICINE

## 2018-06-25 PROCEDURE — 96372 THER/PROPH/DIAG INJ SC/IM: CPT

## 2018-06-25 PROCEDURE — 25010000002 REGADENOSON 0.4 MG/5ML SOLUTION: Performed by: INTERNAL MEDICINE

## 2018-06-25 PROCEDURE — 25010000002 HEPARIN (PORCINE) PER 1000 UNITS: Performed by: NURSE PRACTITIONER

## 2018-06-25 PROCEDURE — 93010 ELECTROCARDIOGRAM REPORT: CPT | Performed by: INTERNAL MEDICINE

## 2018-06-25 PROCEDURE — 93018 CV STRESS TEST I&R ONLY: CPT | Performed by: INTERNAL MEDICINE

## 2018-06-25 PROCEDURE — 84484 ASSAY OF TROPONIN QUANT: CPT | Performed by: INTERNAL MEDICINE

## 2018-06-25 PROCEDURE — 93005 ELECTROCARDIOGRAM TRACING: CPT | Performed by: INTERNAL MEDICINE

## 2018-06-25 RX ORDER — FLECAINIDE ACETATE 50 MG/1
50 TABLET ORAL EVERY 12 HOURS SCHEDULED
Status: DISCONTINUED | OUTPATIENT
Start: 2018-06-25 | End: 2018-06-26 | Stop reason: HOSPADM

## 2018-06-25 RX ADMIN — MULTIPLE VITAMINS W/ MINERALS TAB: TAB ORAL at 09:57

## 2018-06-25 RX ADMIN — CLOPIDOGREL BISULFATE 75 MG: 75 TABLET ORAL at 09:57

## 2018-06-25 RX ADMIN — REGADENOSON 0.4 MG: 0.08 INJECTION, SOLUTION INTRAVENOUS at 08:59

## 2018-06-25 RX ADMIN — RUBIDIUM CHLORIDE RB-82 1 DOSE: 150 INJECTION, SOLUTION INTRAVENOUS at 09:00

## 2018-06-25 RX ADMIN — ASPIRIN 81 MG: 81 TABLET, COATED ORAL at 09:59

## 2018-06-25 RX ADMIN — HEPARIN SODIUM 5000 UNITS: 5000 INJECTION, SOLUTION INTRAVENOUS; SUBCUTANEOUS at 09:57

## 2018-06-25 RX ADMIN — RUBIDIUM CHLORIDE RB-82 1 DOSE: 150 INJECTION, SOLUTION INTRAVENOUS at 08:50

## 2018-06-25 RX ADMIN — FLECAINIDE ACETATE 50 MG: 50 TABLET ORAL at 14:20

## 2018-06-25 RX ADMIN — ATORVASTATIN CALCIUM 40 MG: 40 TABLET, FILM COATED ORAL at 20:32

## 2018-06-25 RX ADMIN — METOPROLOL TARTRATE 25 MG: 25 TABLET ORAL at 09:58

## 2018-06-25 RX ADMIN — HEPARIN SODIUM 5000 UNITS: 5000 INJECTION, SOLUTION INTRAVENOUS; SUBCUTANEOUS at 20:32

## 2018-06-25 RX ADMIN — METAXALONE 800 MG: 800 TABLET ORAL at 09:58

## 2018-06-26 VITALS
DIASTOLIC BLOOD PRESSURE: 65 MMHG | HEIGHT: 67 IN | TEMPERATURE: 98.2 F | WEIGHT: 145.28 LBS | HEART RATE: 77 BPM | RESPIRATION RATE: 20 BRPM | OXYGEN SATURATION: 100 % | BODY MASS INDEX: 22.8 KG/M2 | SYSTOLIC BLOOD PRESSURE: 123 MMHG

## 2018-06-26 PROBLEM — I49.3 PVC'S (PREMATURE VENTRICULAR CONTRACTIONS): Status: ACTIVE | Noted: 2018-06-26

## 2018-06-26 PROCEDURE — G0378 HOSPITAL OBSERVATION PER HR: HCPCS

## 2018-06-26 PROCEDURE — 25010000002 HEPARIN (PORCINE) PER 1000 UNITS: Performed by: NURSE PRACTITIONER

## 2018-06-26 PROCEDURE — 99217 PR OBSERVATION CARE DISCHARGE MANAGEMENT: CPT | Performed by: INTERNAL MEDICINE

## 2018-06-26 PROCEDURE — 93005 ELECTROCARDIOGRAM TRACING: CPT | Performed by: INTERNAL MEDICINE

## 2018-06-26 PROCEDURE — 93010 ELECTROCARDIOGRAM REPORT: CPT | Performed by: INTERNAL MEDICINE

## 2018-06-26 PROCEDURE — 96372 THER/PROPH/DIAG INJ SC/IM: CPT

## 2018-06-26 RX ORDER — FLECAINIDE ACETATE 50 MG/1
50 TABLET ORAL EVERY 12 HOURS SCHEDULED
Qty: 60 TABLET | Refills: 11 | Status: SHIPPED | OUTPATIENT
Start: 2018-06-26 | End: 2018-07-24

## 2018-06-26 RX ADMIN — MULTIPLE VITAMINS W/ MINERALS TAB 1 TABLET: TAB ORAL at 09:11

## 2018-06-26 RX ADMIN — HEPARIN SODIUM 5000 UNITS: 5000 INJECTION, SOLUTION INTRAVENOUS; SUBCUTANEOUS at 09:11

## 2018-06-26 RX ADMIN — METOPROLOL TARTRATE 25 MG: 25 TABLET ORAL at 09:10

## 2018-06-26 RX ADMIN — CLOPIDOGREL BISULFATE 75 MG: 75 TABLET ORAL at 09:11

## 2018-06-26 RX ADMIN — ASPIRIN 81 MG: 81 TABLET, COATED ORAL at 09:11

## 2018-06-26 RX ADMIN — FLECAINIDE ACETATE 50 MG: 50 TABLET ORAL at 00:06

## 2018-06-26 RX ADMIN — FLECAINIDE ACETATE 50 MG: 50 TABLET ORAL at 09:10

## 2018-06-26 RX ADMIN — METAXALONE 800 MG: 800 TABLET ORAL at 09:11

## 2018-07-09 ENCOUNTER — TELEPHONE (OUTPATIENT)
Dept: CARDIOLOGY | Facility: CLINIC | Age: 68
End: 2018-07-09

## 2018-07-09 NOTE — TELEPHONE ENCOUNTER
Patient called in regards to short term disability through [facility], left a message to the  [employer] phone number [phone] ext [phone]. Awaiting call back.

## 2018-07-24 ENCOUNTER — OFFICE VISIT (OUTPATIENT)
Dept: CARDIOLOGY | Facility: CLINIC | Age: 68
End: 2018-07-24

## 2018-07-24 VITALS
HEART RATE: 75 BPM | WEIGHT: 146.8 LBS | DIASTOLIC BLOOD PRESSURE: 66 MMHG | SYSTOLIC BLOOD PRESSURE: 104 MMHG | BODY MASS INDEX: 23.04 KG/M2 | HEIGHT: 67 IN

## 2018-07-24 DIAGNOSIS — I49.3 PVC'S (PREMATURE VENTRICULAR CONTRACTIONS): Primary | ICD-10-CM

## 2018-07-24 DIAGNOSIS — Z79.899 LONG TERM CURRENT USE OF ANTIARRHYTHMIC MEDICAL THERAPY: ICD-10-CM

## 2018-07-24 PROCEDURE — 99214 OFFICE O/P EST MOD 30 MIN: CPT | Performed by: INTERNAL MEDICINE

## 2018-07-24 PROCEDURE — 93000 ELECTROCARDIOGRAM COMPLETE: CPT | Performed by: INTERNAL MEDICINE

## 2018-07-24 RX ORDER — FLECAINIDE ACETATE 50 MG/1
50 TABLET ORAL EVERY 12 HOURS SCHEDULED
Qty: 180 TABLET | Refills: 3 | Status: SHIPPED | OUTPATIENT
Start: 2018-07-24 | End: 2018-07-26 | Stop reason: SDUPTHER

## 2018-07-24 NOTE — PROGRESS NOTES
"    Cierra López  1950  PCP: Francis Lopez MD    SUBJECTIVE:   Cierra López is a 68 y.o. female seen for a follow up visit regarding the following:     Chief Complaint: Follow up for PVCs    HPI:    Since last visit the patient's status has been much improved. PVCs have resolved on Flecainide therapy    History:   Patient is a 68-year-old  female who follows with Dr. Jacobs for her cardiology care.  She presented to the [facility] emergency room in the June 2018 with complaints of increasing palpitations for 1 week.  She states she has been in her normal state of health when on Tuesday she noted increasing palpitations which feels similar to her PVCs that she routinely experiences, but worse in frequency/severity.  She states her palpitations improve with rest and relaxation.  They are unchanged with activity.  She also notes a mild \"burning\" sensation in her chest that is worse when she leans forward.  She states this is mild in nature and denies any associated shortness of breath, dizziness, lightheadedness, diaphoresis, or nausea.  This does not worsen with exertion.  She denies any known triggers for her increase in PVCs.  She denies any stress, anxiety, exposure to tobacco smoke, no excessive EtOH or caffeine or stimulant intake.  She is concerned with her increase in PVCs as she states her last heart attack in 2013 was precipitated by the same events.  Her last stress test in 2015 was normal with no evidence of ischemia.  Initial troponin in the emergency room is negative.  Other labs are unremarkable.     She had a cardiac work with  A Nuclear Stress test and ECHO which showed stable findings. She was started on Flecainide and the PVCs resolved.     Cardiac PMH: (Old records have been reviewed and summarized below)  1.  Coronary artery disease:  a. Non-ST-elevation myocardial infarction,  2013, felt secondary to SVT.  Subsequent cardiac catheterization with 20% " circumflex, 40% mid RCA, distal 50% RCA, 20% LAD.  Normal LVEF.  b. July 2015, normal myocardial perfusion study.   2. History of supraventricular tachycardia, converted with adenosine.  3. Hypertension.  4. Dyslipidemia.  5. Gastroesophageal reflux disease.  6. Depression.  7.  Remote tobacco abuse with cessation in 2014.  8. Diverticulitis:  a.  Status post left hemicolectomy.  9. Tonsillectomy.  10. Appendectomy.  11.  Hysterectomy.  12. Left lumpectomy.  13. Remote bladder surgery    Current Outpatient Prescriptions:   •  aspirin 81 MG EC tablet, Take 81 mg by mouth Daily., Disp: , Rfl:   •  atorvastatin (LIPITOR) 40 MG tablet, Take 1 tablet by mouth Every Night., Disp: 90 tablet, Rfl: 3  •  calcium carbonate (OS-MARK) 600 MG tablet, Take 600 mg by mouth Daily., Disp: , Rfl:   •  flecainide (TAMBOCOR) 50 MG tablet, Take 1 tablet by mouth Every 12 (Twelve) Hours., Disp: 60 tablet, Rfl: 11  •  metaxalone (SKELAXIN) 800 MG tablet, Take 800 mg by mouth Daily., Disp: , Rfl:   •  metoprolol succinate XL (TOPROL-XL) 25 MG 24 hr tablet, Take 1 tablet by mouth Daily., Disp: 90 tablet, Rfl: 3  •  Multiple Vitamins-Minerals (MULTIVITAMIN ADULT PO), Take 1 tablet by mouth Daily., Disp: , Rfl:   •  Omega-3 Fatty Acids (FISH OIL) 1000 MG capsule capsule, Take 1 capsule by mouth Daily With Breakfast., Disp: 90 capsule, Rfl: 3    Past Medical History, Past Surgical History, Family history, Social History, and Medications were all reviewed with the patient today and updated as necessary.       Patient Active Problem List   Diagnosis   • CAD (coronary artery disease)   • Supraventricular tachycardia (CMS/HCC)   • Hypertension   • Dyslipidemia   • GERD (gastroesophageal reflux disease)   • Depression   • Diverticulitis   • Palpitations   • PVC's (premature ventricular contractions)   • Long term current use of antiarrhythmic medical therapy     No Known Allergies  Past Medical History:   Diagnosis Date   • CAD (coronary artery  "disease)    • Depression    • Diverticulitis     S/P Left hemicolectomy   • Dyslipidemia    • Fatigue     daytime somnolence   • GERD (gastroesophageal reflux disease)    • Hypertension    • Palpitations     Recurrent palpitations consistent with premature atrial contractions/premature ventricular contractions. On beta blocker therapy.   • Supraventricular tachycardia (CMS/HCC)     converted with adenosine     Past Surgical History:   Procedure Laterality Date   • APPENDECTOMY     • BREAST SURGERY Left     lumpectomy   • HEMICOLECTOMY Left    • HYSTERECTOMY     • OTHER SURGICAL HISTORY      bladder surgery   • TONSILLECTOMY       Family History   Problem Relation Age of Onset   • Heart disease Mother      Social History   Substance Use Topics   • Smoking status: Former Smoker     Quit date: 2014   • Smokeless tobacco: Never Used   • Alcohol use No         PHYSICAL EXAM:    /66 (BP Location: Left arm, Patient Position: Sitting)   Pulse 75   Ht 170.2 cm (67\")   Wt 66.6 kg (146 lb 12.8 oz)   BMI 22.99 kg/m²        Wt Readings from Last 5 Encounters:   07/24/18 66.6 kg (146 lb 12.8 oz)   06/25/18 65.9 kg (145 lb 4.5 oz)   03/28/18 68.5 kg (151 lb)   03/22/17 73.8 kg (162 lb 9.6 oz)   01/19/17 73.9 kg (163 lb)       BP Readings from Last 5 Encounters:   07/24/18 104/66   06/26/18 123/65   03/28/18 132/76   03/22/17 120/62   01/19/17 125/73       General-Well Nourished, Well developed  Eyes - PERRLA  Neck- supple, No mass  CV- regular rate and rhythm, no MRG, No edema  Lung- clear bilaterally  Abd- soft, +BS  Musc/skel - Norm strength and range of motion  Skin- warm and dry  Neuro - Alert & Oriented x 3, appropriate mood.        Medical problems and test results were reviewed with the patient today.     No results found for this or any previous visit (from the past 672 hour(s)).      EKG: (EKG has been independently visualized by me and summarized below)      ECG 12 Lead  Date/Time: 7/24/2018 12:01 PM  Performed " by: JV CERVANTES  Authorized by: JV CERVANTES   Previous ECG: no previous ECG available  Rhythm: sinus rhythm  Rate: normal  BPM: 75  Conduction: conduction normal  ST Segments: ST segments normal  T Waves: T waves normal  QRS axis: normal  Clinical impression: normal ECG             ASSESSMENT and PLAN  1.  PVCs-started on flecainide therapy. Much improved and have resolved on Flecainide. Will follow up with EP service if palp return.   2. Use of Flecainide - Monitor EKG    Follow up with Dr. Jacobs      Return if symptoms worsen or fail to improve.        Jv Cervantes M.D., F.A.C.C, F.H.R.S.  Cardiology/Electrophysiology  7/24/2018  12:04 PM

## 2018-07-25 ENCOUNTER — TELEPHONE (OUTPATIENT)
Dept: CARDIOLOGY | Facility: CLINIC | Age: 68
End: 2018-07-25

## 2018-07-25 NOTE — TELEPHONE ENCOUNTER
Pt called and left a voicemail to tell us her pharmacy [facility] did not receive her refill. Also that she needs her FMLA paperwork she just turned into our office yesterday. I called the pt back and left her a message telling her I need to know the medication name that she needs refills on and that the [place] paperwork is on Divya's desk to be signed.

## 2018-07-26 RX ORDER — FLECAINIDE ACETATE 50 MG/1
50 TABLET ORAL EVERY 12 HOURS SCHEDULED
Qty: 180 TABLET | Refills: 3 | Status: SHIPPED | OUTPATIENT
Start: 2018-07-26 | End: 2020-04-09 | Stop reason: SDUPTHER

## 2018-12-04 DIAGNOSIS — Z00.00 HEALTHCARE MAINTENANCE: ICD-10-CM

## 2018-12-04 RX ORDER — METOPROLOL SUCCINATE 25 MG/1
25 TABLET, EXTENDED RELEASE ORAL DAILY
Qty: 90 TABLET | Refills: 3 | Status: SHIPPED | OUTPATIENT
Start: 2018-12-04 | End: 2020-04-09 | Stop reason: SDUPTHER

## 2019-04-01 NOTE — PROGRESS NOTES
Subjective:     Encounter Date:04/10/2019    Primary Care Physician: Francis Lopez MD      Patient ID: Cierra López is a 69 y.o. female.    Chief Complaint:Follow-up    PROBLEM LIST:  1.  Coronary artery disease:  a. Non-ST-elevation myocardial infarction,  2013, felt secondary to SVT.  Subsequent cardiac catheterization with 20% circumflex, 40% mid RCA, distal 50% RCA, 20% LAD.  Normal LVEF.  b. July 2015, normal myocardial perfusion study.   2. History of supraventricular tachycardia, converted with adenosine.  3. PVC  a. On flecainide per EP Dr. Cervantes  4. Hypertension.  5. Dyslipidemia.  6. Gastroesophageal reflux disease.  7. Depression.  8.  Remote tobacco abuse with cessation in 2014.  9. Diverticulitis:  a.  Status post left hemicolectomy.  10. Tonsillectomy.  11. Appendectomy.  12.  Hysterectomy.  13. Left lumpectomy.  14. Remote bladder surgery        No Known Allergies      Current Outpatient Medications:   •  aspirin 81 MG EC tablet, Take 81 mg by mouth Daily., Disp: , Rfl:   •  atorvastatin (LIPITOR) 40 MG tablet, Take 1 tablet by mouth Every Night., Disp: 90 tablet, Rfl: 3  •  calcium carbonate (OS-MARK) 600 MG tablet, Take 600 mg by mouth Daily., Disp: , Rfl:   •  cephalexin (KEFLEX) 500 MG capsule, Take 500 mg by mouth 3 (Three) Times a Day., Disp: , Rfl:   •  flecainide (TAMBOCOR) 50 MG tablet, Take 1 tablet by mouth Every 12 (Twelve) Hours., Disp: 180 tablet, Rfl: 3  •  metoprolol succinate XL (TOPROL-XL) 25 MG 24 hr tablet, Take 1 tablet by mouth Daily., Disp: 90 tablet, Rfl: 3  •  Multiple Vitamins-Minerals (MULTIVITAMIN ADULT PO), Take 1 tablet by mouth Daily., Disp: , Rfl:   •  naproxen (NAPROSYN) 500 MG tablet, Take 500 mg by mouth 2 (Two) Times a Day As Needed for Mild Pain ., Disp: , Rfl:   •  Omega-3 Fatty Acids (FISH OIL) 1000 MG capsule capsule, Take 1 capsule by mouth Daily With Breakfast., Disp: 90 capsule, Rfl: 3        History of Present Illness    Patient returns today  "for annual follow-up of coronary disease and PVCs.  Since last being seen she notes to overall be doing well.  Back in the summer she presented to the emergency department with frequent PVCs which she was symptomatic with.  She was evaluated by her electrophysiologist and started on flecainide therapy.  Since that time she notes to be doing very well.  She has not had recurrent palpitations.  She denies any chest pain, pressure, tightness.  She denies any increasing shortness of breath.  No syncope, near syncope, or edema.  She states that she is compliant with her medications.    The following portions of the patient's history were reviewed and updated as appropriate: allergies, current medications, past family history, past medical history, past social history, past surgical history and problem list.      Social History     Tobacco Use   • Smoking status: Former Smoker     Last attempt to quit:      Years since quittin.2   • Smokeless tobacco: Never Used   Substance Use Topics   • Alcohol use: No   • Drug use: No         Review of Systems   Constitution: Positive for chills.   Cardiovascular: Negative.    Respiratory: Positive for sputum production.    Hematologic/Lymphatic: Negative for bleeding problem. Does not bruise/bleed easily.   Skin: Positive for dry skin and itching. Negative for rash.   Musculoskeletal: Negative for muscle weakness and myalgias.   Gastrointestinal: Negative for heartburn, nausea and vomiting.   Neurological: Positive for excessive daytime sleepiness.          Objective:    height is 170.2 cm (67\") and weight is 65.8 kg (145 lb). Her blood pressure is 120/68 and her pulse is 59.         Physical Exam      ECG 12 Lead  Date/Time: 4/10/2019 1:04 PM  Performed by: Evi Shepard APRN  Authorized by: Evi Shepard APRN   Comparison: compared with previous ECG from 2018  Similar to previous ECG  Rhythm: sinus bradycardia  Comments: Otherwise within normal " limits.                  Assessment:   Assessment/Plan      Cierra was seen today for follow-up.    Diagnoses and all orders for this visit:    Coronary artery disease involving native coronary artery of native heart without angina pectoris    PVC's (premature ventricular contractions), stable on current medical therapy.    Dyslipidemia, on statin therapy.  Labs with primary care.    Essential hypertension, controlled.    Other orders  -     ECG 12 Lead      Plan:  1. Currently we will continue flecainide for her PVCs as this is being well tolerated.  2. Continue statin for dyslipidemia and previous history of coronary disease.  3. Continue aspirin and beta-blocker therapy for coronary disease as well.  4. Follow-up in 1 years time or sooner if needed.       Evi VIRGEN     Dictated utilizing Dragon dictation

## 2019-04-10 ENCOUNTER — OFFICE VISIT (OUTPATIENT)
Dept: CARDIOLOGY | Facility: CLINIC | Age: 69
End: 2019-04-10

## 2019-04-10 VITALS
BODY MASS INDEX: 22.76 KG/M2 | WEIGHT: 145 LBS | DIASTOLIC BLOOD PRESSURE: 68 MMHG | HEIGHT: 67 IN | SYSTOLIC BLOOD PRESSURE: 120 MMHG | HEART RATE: 59 BPM

## 2019-04-10 DIAGNOSIS — I49.3 PVC'S (PREMATURE VENTRICULAR CONTRACTIONS): ICD-10-CM

## 2019-04-10 DIAGNOSIS — E78.5 DYSLIPIDEMIA: ICD-10-CM

## 2019-04-10 DIAGNOSIS — I10 ESSENTIAL HYPERTENSION: ICD-10-CM

## 2019-04-10 DIAGNOSIS — I25.10 CORONARY ARTERY DISEASE INVOLVING NATIVE CORONARY ARTERY OF NATIVE HEART WITHOUT ANGINA PECTORIS: Primary | ICD-10-CM

## 2019-04-10 PROCEDURE — 99214 OFFICE O/P EST MOD 30 MIN: CPT | Performed by: NURSE PRACTITIONER

## 2019-04-10 PROCEDURE — 93000 ELECTROCARDIOGRAM COMPLETE: CPT | Performed by: NURSE PRACTITIONER

## 2019-04-10 RX ORDER — CEPHALEXIN 500 MG/1
500 CAPSULE ORAL 3 TIMES DAILY
COMMUNITY
End: 2020-04-09

## 2019-04-10 RX ORDER — NAPROXEN 500 MG/1
500 TABLET ORAL 2 TIMES DAILY PRN
COMMUNITY
End: 2020-04-09

## 2020-04-08 NOTE — PROGRESS NOTES
[facility]  Subjective:     Encounter Date:04/09/2020      Patient ID: Cirera López is a 70 y.o. female.    Chief Complaint: Coronary Artery Disease      PROBLEM LIST:  1.  Coronary artery disease:  a. [facility], 2013 for NSTEMI, felt secondary to SVT: 20% circumflex, 40% mid RCA, distal 50% RCA, 20% LAD.  Normal LVEF.  b. MPS, July 2015: Normal.  c. MPS, 6/25/2018: No evidence of ischemia. EF at rest 42% and 60% with stress.   2. History of supraventricular tachycardia, converted with adenosine.  3. PVC  a. On flecainide per EP Dr. Helen garvey. Echo, 6/25/2018: EF 65%, frequent PVC's during exam. There is no outflow obstruction or significant valvular disease.  c. MPS, 6/25/2018: PVC's noted in [place] pattern at rest, became less frequent post Lexiscan.  4. Hypertension.  5. Dyslipidemia.  6. GERD  7. Depression.  8. Remote tobacco abuse with cessation in 2014.  9. Diverticulitis:  a.  Status post left hemicolectomy.  10. Surgical history:   a. Tonsillectomy.  b. Appendectomy.  c. Hysterectomy.  d. Left lumpectomy.  e. Remote bladder surgery  f. Cataract       History of Present Illness  Cierra López has a telephone visit today for a 1 year follow up with a history of CAD, PVC, and cardiac risk factors. Since last visit since last visit, patient overall think she is feeling well.  She had cataract surgery.  She has no exertional chest pain dyspnea or claudication denies orthopnea PND or at.  She has rare occasional palpitations much less than she had prior, and is thinking the flecainide is working well.  Her only interval full medical problem was bright red blood per rectum, occurring last week for which he saw Dr. Lopez.  Given her previous scopes only some AVMs, it was decided that she did not need another scope.  She was started on Prilosec and had her aspirin discontinued and she notes her bleeding has stopped.  She is scheduled to resume her aspirin next  "week.    No Known Allergies      Current Outpatient Medications:   •  aspirin 81 MG EC tablet, Take 81 mg by mouth Daily., Disp: , Rfl:   •  atorvastatin (LIPITOR) 40 MG tablet, Take 1 tablet by mouth Every Night., Disp: 90 tablet, Rfl: 3  •  calcium-vitamin D (OSCAL) 250-125 MG-UNIT per tablet, Take 1 tablet by mouth Daily., Disp: , Rfl:   •  flecainide (TAMBOCOR) 50 MG tablet, Take 1 tablet by mouth Every 12 (Twelve) Hours., Disp: 180 tablet, Rfl: 3  •  metoprolol succinate XL (TOPROL-XL) 25 MG 24 hr tablet, Take 1 tablet by mouth Daily., Disp: 90 tablet, Rfl: 3  •  Multiple Vitamins-Minerals (MULTIVITAMIN ADULT PO), Take 1 tablet by mouth Daily., Disp: , Rfl:   •  Omega-3 Fatty Acids (FISH OIL) 1000 MG capsule capsule, Take 1 capsule by mouth Daily With Breakfast., Disp: 90 capsule, Rfl: 3  •  omeprazole (priLOSEC) 20 MG capsule, Take 20 mg by mouth Daily., Disp: , Rfl:     The following portions of the patient's history were reviewed and updated as appropriate: allergies, current medications, past family history, past medical history, past social history, past surgical history and problem list.    Review of Systems   Constitution: Negative.   Cardiovascular: Negative.    Respiratory: Negative.    Hematologic/Lymphatic: Negative for bleeding problem. Does not bruise/bleed easily.   Skin: Negative for rash.   Musculoskeletal: Negative for muscle weakness and myalgias.   Gastrointestinal: Positive for change in bowel habit. Negative for heartburn, nausea and vomiting.   Neurological: Negative.           Objective:     Vitals:    04/09/20 1353   Weight: 64.9 kg (143 lb)   Height: 170.2 cm (67\")         Physical Exam   No physical exam performed secondary to telephone visit.      Lab Review:  03/26/2019  Lipid Panel:      HDL 44  LDL 84    Procedures        Assessment:   Cierra was seen today for coronary artery disease.    Diagnoses and all orders for this visit:    Coronary artery disease involving " native coronary artery of native heart without angina pectoris    Supraventricular tachycardia (CMS/HCC)    PVC's (premature ventricular contractions)    Essential hypertension        Plan:  1. Coronary artery disease, stable without angina.  2. Resume aspirin next week.  81 mg.  3. Continue flecainide for PVCs.  4. Dyslipidemia last LDL 84 will have fasting lipids rechecked by primary care physician once the pandemic precautions have been lifted  5. Continue current medications.  6. Revisit in 12 MO, or sooner as needed.    This patient has consented to a telehealth visit via telephone. The visit was scheduled as a telephone visit to comply with patient safety concerns in accordance with CDC recommendations.  All vitals recorded within this visit are reported by the patient.  I spent 15 minutes in total including but not limited to the 8 minutes spent in direct conversation with this patient.        Cristi Jacobs MD      Please note that portions of this note may have been completed with a voice recognition program. Efforts were made to edit the dictations, but occasionally words are mistranscribed.

## 2020-04-09 ENCOUNTER — OFFICE VISIT (OUTPATIENT)
Dept: CARDIOLOGY | Facility: CLINIC | Age: 70
End: 2020-04-09

## 2020-04-09 VITALS — HEIGHT: 67 IN | WEIGHT: 143 LBS | BODY MASS INDEX: 22.44 KG/M2

## 2020-04-09 DIAGNOSIS — I47.1 SUPRAVENTRICULAR TACHYCARDIA (HCC): ICD-10-CM

## 2020-04-09 DIAGNOSIS — I25.10 CORONARY ARTERY DISEASE INVOLVING NATIVE CORONARY ARTERY OF NATIVE HEART WITHOUT ANGINA PECTORIS: Primary | ICD-10-CM

## 2020-04-09 DIAGNOSIS — I49.3 PVC'S (PREMATURE VENTRICULAR CONTRACTIONS): ICD-10-CM

## 2020-04-09 DIAGNOSIS — Z00.00 HEALTHCARE MAINTENANCE: ICD-10-CM

## 2020-04-09 DIAGNOSIS — I10 ESSENTIAL HYPERTENSION: ICD-10-CM

## 2020-04-09 PROCEDURE — 99441 PR PHYS/QHP TELEPHONE EVALUATION 5-10 MIN: CPT | Performed by: INTERNAL MEDICINE

## 2020-04-09 RX ORDER — METOPROLOL SUCCINATE 25 MG/1
25 TABLET, EXTENDED RELEASE ORAL DAILY
Qty: 90 TABLET | Refills: 3 | Status: SHIPPED | OUTPATIENT
Start: 2020-04-09 | End: 2021-05-19

## 2020-04-09 RX ORDER — CHLORAL HYDRATE 500 MG
1000 CAPSULE ORAL
Qty: 90 CAPSULE | Refills: 3 | Status: SHIPPED | OUTPATIENT
Start: 2020-04-09

## 2020-04-09 RX ORDER — OMEPRAZOLE 20 MG/1
20 CAPSULE, DELAYED RELEASE ORAL DAILY
COMMUNITY
End: 2021-04-14

## 2020-04-09 RX ORDER — FLECAINIDE ACETATE 50 MG/1
50 TABLET ORAL EVERY 12 HOURS SCHEDULED
Qty: 180 TABLET | Refills: 3 | Status: SHIPPED | OUTPATIENT
Start: 2020-04-09 | End: 2021-05-19

## 2020-04-09 RX ORDER — ATORVASTATIN CALCIUM 40 MG/1
40 TABLET, FILM COATED ORAL NIGHTLY
Qty: 90 TABLET | Refills: 3 | Status: SHIPPED | OUTPATIENT
Start: 2020-04-09 | End: 2021-05-19

## 2021-04-14 ENCOUNTER — OFFICE VISIT (OUTPATIENT)
Dept: CARDIOLOGY | Facility: CLINIC | Age: 71
End: 2021-04-14

## 2021-04-14 VITALS
WEIGHT: 138 LBS | BODY MASS INDEX: 21.66 KG/M2 | HEART RATE: 76 BPM | HEIGHT: 67 IN | SYSTOLIC BLOOD PRESSURE: 118 MMHG | DIASTOLIC BLOOD PRESSURE: 70 MMHG

## 2021-04-14 DIAGNOSIS — I25.10 CORONARY ARTERY DISEASE INVOLVING NATIVE CORONARY ARTERY OF NATIVE HEART WITHOUT ANGINA PECTORIS: Primary | ICD-10-CM

## 2021-04-14 DIAGNOSIS — I47.1 SUPRAVENTRICULAR TACHYCARDIA (HCC): ICD-10-CM

## 2021-04-14 DIAGNOSIS — I10 ESSENTIAL HYPERTENSION: ICD-10-CM

## 2021-04-14 DIAGNOSIS — I25.10 CORONARY ARTERY DISEASE INVOLVING NATIVE CORONARY ARTERY OF NATIVE HEART, ANGINA PRESENCE UNSPECIFIED: Primary | ICD-10-CM

## 2021-04-14 DIAGNOSIS — E78.5 DYSLIPIDEMIA: ICD-10-CM

## 2021-04-14 PROCEDURE — 99214 OFFICE O/P EST MOD 30 MIN: CPT | Performed by: INTERNAL MEDICINE

## 2021-04-14 NOTE — PROGRESS NOTES
Subjective:     Encounter Date:04/14/2021    Primary Care Physician: Francis Lopez MD      Patient ID: Cierra López is a 71 y.o. female.    Chief Complaint:Annual Exam    PROBLEM LIST:  1.  Coronary artery disease:  a. [facility], 2013 for NSTEMI, felt secondary to SVT: 20% circumflex, 40% mid RCA, distal 50% RCA, 20% LAD.  Normal LVEF.  b. MPS, July 2015: Normal.  c. MPS, 6/25/2018: No evidence of ischemia. EF at rest 42% and 60% with stress.   2. History of supraventricular tachycardia, converted with adenosine.  3. PVC  a. On flecainide per EP Dr. Helen garvey. Echo, 6/25/2018: EF 65%, frequent PVC's during exam. There is no outflow obstruction or significant valvular disease.  c. MPS, 6/25/2018: PVC's noted in [place] pattern at rest, became less frequent post Lexiscan.  4. Hypertension.  5. Dyslipidemia.  6. GERD  7. Depression.  8. Remote tobacco abuse with cessation in 2014.  9. Diverticulitis:  a.  Status post left hemicolectomy.  10. Surgical history:   a. Tonsillectomy.  b. Appendectomy.  c. Hysterectomy.  d. Left lumpectomy.  e. Remote bladder surgery  f. Cataract      No Known Allergies      Current Outpatient Medications:   •  aspirin 81 MG EC tablet, Take 81 mg by mouth Daily., Disp: , Rfl:   •  atorvastatin (LIPITOR) 40 MG tablet, Take 1 tablet by mouth Every Night., Disp: 90 tablet, Rfl: 3  •  calcium-vitamin D (OSCAL) 250-125 MG-UNIT per tablet, Take 1 tablet by mouth Daily., Disp: , Rfl:   •  flecainide (TAMBOCOR) 50 MG tablet, Take 1 tablet by mouth Every 12 (Twelve) Hours., Disp: 180 tablet, Rfl: 3  •  metoprolol succinate XL (TOPROL-XL) 25 MG 24 hr tablet, Take 1 tablet by mouth Daily., Disp: 90 tablet, Rfl: 3  •  Multiple Vitamins-Minerals (MULTIVITAMIN ADULT PO), Take 1 tablet by mouth Daily., Disp: , Rfl:   •  Omega-3 Fatty Acids (FISH OIL) 1000 MG capsule capsule, Take 1 capsule by mouth Daily With Breakfast., Disp: 90 capsule, Rfl: 3        History of Present Illness    Patient  "returns today for annual follow-up of coronary artery disease, and SVT.  Since her last visit she overall is doing very well she is active and asymptomatic.  No cardiovascular complaints.    The following portions of the patient's history were reviewed and updated as appropriate: allergies, current medications, past family history, past medical history, past social history, past surgical history and problem list.    Social History     Tobacco Use   • Smoking status: Former Smoker     Quit date:      Years since quittin.2   • Smokeless tobacco: Never Used   Substance Use Topics   • Alcohol use: No   • Drug use: No         ROS       Objective:   /70   Pulse 76   Ht 170.2 cm (67\")   Wt 62.6 kg (138 lb)   BMI 21.61 kg/m²         Vitals reviewed.   Constitutional:       Appearance: Well-developed and not in distress.   Neck:      Thyroid: No thyromegaly.      Vascular: No carotid bruit or JVD.   Pulmonary:      Breath sounds: Normal breath sounds.   Cardiovascular:      Regular rhythm.      No gallop. No S3 and S4 gallop.   Abdominal:      General: Bowel sounds are normal.      Palpations: Abdomen is soft. There is no abdominal mass.      Tenderness: There is no abdominal tenderness.   Musculoskeletal:         General: No deformity.      Extremities: No clubbing present.Skin:     General: Skin is warm and dry.      Findings: No rash.   Neurological:      Mental Status: Alert and oriented to person, place, and time.         Procedures          Assessment:   Assessment/Plan      Diagnoses and all orders for this visit:    1. Coronary artery disease involving native coronary artery of native heart without angina pectoris (Primary)    2. Supraventricular tachycardia (CMS/HCC)    3. Essential hypertension    4. Dyslipidemia      1.  Coronary artery disease, nonflow ablated by cath 7 years ago normal perfusion study 3 years ago.  No current angina  2.  Dyslipidemia last LDL of 94 on high-dose statin.  3.  " Hypertension well-controlled on metoprolol  4.  SVT no recurrence on metoprolol and flecainide.    Recommendations  1.  Continue current medical therapy  2.  Given the fact she is on flecainide with moderate CAD, she will need an ischemic evaluation prior to next visit.  3.  Discussed dietary changes to decrease her LDL.  Would not dose her statin at this time but does have several follow-up with her primary care physician.  4.  Revisit annually apparent symptom change            Dictated utilizing Dragon dictation

## 2021-05-19 DIAGNOSIS — Z00.00 HEALTHCARE MAINTENANCE: ICD-10-CM

## 2021-05-19 RX ORDER — ATORVASTATIN CALCIUM 40 MG/1
TABLET, FILM COATED ORAL
Qty: 90 TABLET | Refills: 3 | Status: SHIPPED | OUTPATIENT
Start: 2021-05-19 | End: 2022-05-19 | Stop reason: SDUPTHER

## 2021-05-19 RX ORDER — FLECAINIDE ACETATE 50 MG/1
TABLET ORAL
Qty: 180 TABLET | Refills: 3 | Status: SHIPPED | OUTPATIENT
Start: 2021-05-19 | End: 2022-05-19 | Stop reason: SDUPTHER

## 2021-05-19 RX ORDER — METOPROLOL SUCCINATE 25 MG/1
TABLET, EXTENDED RELEASE ORAL
Qty: 90 TABLET | Refills: 3 | Status: SHIPPED | OUTPATIENT
Start: 2021-05-19 | End: 2022-05-19 | Stop reason: SDUPTHER

## 2021-12-03 DIAGNOSIS — I25.10 CORONARY ARTERY DISEASE INVOLVING NATIVE CORONARY ARTERY OF NATIVE HEART WITHOUT ANGINA PECTORIS: Primary | ICD-10-CM

## 2021-12-03 DIAGNOSIS — I47.1 SUPRAVENTRICULAR TACHYCARDIA (HCC): ICD-10-CM

## 2021-12-03 DIAGNOSIS — R00.2 PALPITATIONS: ICD-10-CM

## 2022-04-18 ENCOUNTER — HOSPITAL ENCOUNTER (OUTPATIENT)
Dept: CARDIOLOGY | Facility: HOSPITAL | Age: 72
Discharge: HOME | End: 2022-04-18
Admitting: INTERNAL MEDICINE

## 2022-04-18 VITALS
DIASTOLIC BLOOD PRESSURE: 72 MMHG | WEIGHT: 138 LBS | SYSTOLIC BLOOD PRESSURE: 160 MMHG | HEART RATE: 81 BPM | BODY MASS INDEX: 21.66 KG/M2 | HEIGHT: 67 IN

## 2022-04-18 DIAGNOSIS — I47.1 SUPRAVENTRICULAR TACHYCARDIA: ICD-10-CM

## 2022-04-18 DIAGNOSIS — R00.2 PALPITATIONS: ICD-10-CM

## 2022-04-18 DIAGNOSIS — I25.10 CORONARY ARTERY DISEASE INVOLVING NATIVE CORONARY ARTERY OF NATIVE HEART WITHOUT ANGINA PECTORIS: ICD-10-CM

## 2022-04-18 PROCEDURE — 93017 CV STRESS TEST TRACING ONLY: CPT

## 2022-04-18 PROCEDURE — 0 TECHNETIUM SESTAMIBI: Performed by: INTERNAL MEDICINE

## 2022-04-18 PROCEDURE — 78452 HT MUSCLE IMAGE SPECT MULT: CPT

## 2022-04-18 PROCEDURE — 78452 HT MUSCLE IMAGE SPECT MULT: CPT | Performed by: INTERNAL MEDICINE

## 2022-04-18 PROCEDURE — A9500 TC99M SESTAMIBI: HCPCS | Performed by: INTERNAL MEDICINE

## 2022-04-18 PROCEDURE — 93018 CV STRESS TEST I&R ONLY: CPT | Performed by: INTERNAL MEDICINE

## 2022-04-18 RX ADMIN — TECHNETIUM TC 99M SESTAMIBI 1 DOSE: 1 INJECTION INTRAVENOUS at 10:20

## 2022-04-18 RX ADMIN — TECHNETIUM TC 99M SESTAMIBI 1 DOSE: 1 INJECTION INTRAVENOUS at 12:40

## 2022-04-19 LAB
BH CV REST NUCLEAR ISOTOPE DOSE: 9.5 MCI
BH CV STRESS BP STAGE 1: NORMAL
BH CV STRESS BP STAGE 2: NORMAL
BH CV STRESS DURATION MIN STAGE 1: 3
BH CV STRESS DURATION MIN STAGE 2: 2
BH CV STRESS DURATION SEC STAGE 1: 0
BH CV STRESS DURATION SEC STAGE 2: 50
BH CV STRESS GRADE STAGE 1: 10
BH CV STRESS GRADE STAGE 2: 12
BH CV STRESS HR STAGE 1: 146
BH CV STRESS HR STAGE 2: 169
BH CV STRESS METS STAGE 1: 5
BH CV STRESS METS STAGE 2: 7.5
BH CV STRESS NUCLEAR ISOTOPE DOSE: 30.4 MCI
BH CV STRESS O2 STAGE 1: 98
BH CV STRESS O2 STAGE 2: 97
BH CV STRESS PROTOCOL 1: NORMAL
BH CV STRESS RECOVERY BP: NORMAL MMHG
BH CV STRESS RECOVERY HR: 109 BPM
BH CV STRESS RECOVERY O2: 99 %
BH CV STRESS SPEED STAGE 1: 1.7
BH CV STRESS SPEED STAGE 2: 2.5
BH CV STRESS STAGE 1: 1
BH CV STRESS STAGE 2: 2
LV EF NUC BP: 81 %
MAXIMAL PREDICTED HEART RATE: 150 BPM
PERCENT MAX PREDICTED HR: 114 %
STRESS BASELINE BP: NORMAL MMHG
STRESS BASELINE HR: 85 BPM
STRESS O2 SAT REST: 99 %
STRESS PERCENT HR: 134 %
STRESS POST ESTIMATED WORKLOAD: 7 METS
STRESS POST EXERCISE DUR MIN: 5 MIN
STRESS POST EXERCISE DUR SEC: 50 SEC
STRESS POST O2 SAT PEAK: 97 %
STRESS POST PEAK BP: NORMAL MMHG
STRESS POST PEAK HR: 171 BPM
STRESS TARGET HR: 128 BPM

## 2022-04-20 ENCOUNTER — OFFICE VISIT (OUTPATIENT)
Dept: CARDIOLOGY | Facility: CLINIC | Age: 72
End: 2022-04-20

## 2022-04-20 VITALS
WEIGHT: 124 LBS | HEIGHT: 67 IN | OXYGEN SATURATION: 98 % | HEART RATE: 81 BPM | RESPIRATION RATE: 18 BRPM | DIASTOLIC BLOOD PRESSURE: 64 MMHG | BODY MASS INDEX: 19.46 KG/M2 | SYSTOLIC BLOOD PRESSURE: 108 MMHG

## 2022-04-20 DIAGNOSIS — I10 PRIMARY HYPERTENSION: ICD-10-CM

## 2022-04-20 DIAGNOSIS — E78.5 DYSLIPIDEMIA: ICD-10-CM

## 2022-04-20 DIAGNOSIS — I49.3 PVC'S (PREMATURE VENTRICULAR CONTRACTIONS): ICD-10-CM

## 2022-04-20 DIAGNOSIS — I25.10 CORONARY ARTERY DISEASE INVOLVING NATIVE CORONARY ARTERY OF NATIVE HEART WITHOUT ANGINA PECTORIS: Primary | ICD-10-CM

## 2022-04-20 PROCEDURE — 99213 OFFICE O/P EST LOW 20 MIN: CPT | Performed by: INTERNAL MEDICINE

## 2022-04-20 NOTE — PROGRESS NOTES
Subjective:     Encounter Date:04/20/2022    Primary Care Physician: Francis Lopez MD      Patient ID: Cierra López is a 72 y.o. female.    Chief Complaint:Follow-up and Coronary Artery Disease      PROBLEM LIST:  1.  Coronary artery disease:  a. [facility], 2013 for NSTEMI, felt secondary to SVT: 20% circumflex, 40% mid RCA, distal 50% RCA, 20% LAD.  Normal LVEF.  b. MPS, July 2015: Normal.  c. MPS, 6/25/2018: No evidence of ischemia. EF at rest 42% and 60% with stress.   d. 4/18/2022 MPS negative for ischemia.  EF greater than 70%.  2. History of supraventricular tachycardia, converted with adenosine.  3. PVC  a. On flecainide per EP Dr. Helen garvey. Echo, 6/25/2018: EF 65%, frequent PVC's during exam. There is no outflow obstruction or significant valvular disease.  c. MPS, 4/19/2022.  Normal  4. Hypertension.  5. Dyslipidemia.  6. GERD  7. Depression.  8. Remote tobacco abuse with cessation in 2014.  9. Diverticulitis:  a.  Status post left hemicolectomy.  10. Surgical history:   a. Tonsillectomy.  b. Appendectomy.  c. Hysterectomy.  d. Left lumpectomy.  e. Remote bladder surgery  f. Cataract      No Known Allergies      Current Outpatient Medications:   •  aspirin 81 MG EC tablet, Take 81 mg by mouth Daily., Disp: , Rfl:   •  atorvastatin (LIPITOR) 40 MG tablet, TAKE 1 TABLET BY MOUTH EVERY DAY AT NIGHT, Disp: 90 tablet, Rfl: 3  •  calcium-vitamin D (OSCAL) 250-125 MG-UNIT per tablet, Take 1 tablet by mouth Daily., Disp: , Rfl:   •  flecainide (TAMBOCOR) 50 MG tablet, TAKE 1 TABLET BY MOUTH EVERY 12 HOURS, Disp: 180 tablet, Rfl: 3  •  metoprolol succinate XL (TOPROL-XL) 25 MG 24 hr tablet, TAKE 1 TABLET BY MOUTH EVERY DAY, Disp: 90 tablet, Rfl: 3  •  Multiple Vitamins-Minerals (MULTIVITAMIN ADULT PO), Take 1 tablet by mouth Daily., Disp: , Rfl:   •  Omega-3 Fatty Acids (FISH OIL) 1000 MG capsule capsule, Take 1 capsule by mouth Daily With Breakfast., Disp: 90 capsule, Rfl: 3        History of Present  "Illness    Patient returns today for annual follow-up of coronary artery disease PVCs hypertension and dyslipidemia.  Since her next visit she overall is doing very well.  She has no cardiovascular plaints.  Underwent a stress test last week which was normal.  She is very active doing yard work and take care of her disabled dogs.  Notes rare occasional palpitations are infrequent and not bothersome to her.  Her main issue is the cost increased from her insurance plan for her flecainide.    The following portions of the patient's history were reviewed and updated as appropriate: allergies, current medications, past family history, past medical history, past social history, past surgical history and problem list.      Social History     Tobacco Use   • Smoking status: Former Smoker     Quit date:      Years since quittin.3   • Smokeless tobacco: Never Used   Substance Use Topics   • Alcohol use: No   • Drug use: No         ROS       Objective:   /64   Pulse 81   Resp 18   Ht 170.2 cm (67\")   Wt 56.2 kg (124 lb)   SpO2 98%   BMI 19.42 kg/m²         Vitals reviewed.   Constitutional:       Appearance: Well-developed and not in distress.   Neck:      Thyroid: No thyromegaly.      Vascular: No carotid bruit or JVD.   Pulmonary:      Breath sounds: Normal breath sounds.   Cardiovascular:      Regular rhythm.      No gallop. No S3 and S4 gallop.   Abdominal:      General: Bowel sounds are normal.      Palpations: Abdomen is soft. There is no abdominal mass.      Tenderness: There is no abdominal tenderness.   Musculoskeletal:         General: No deformity.      Extremities: No clubbing present.Skin:     General: Skin is warm and dry.      Findings: No rash.   Neurological:      Mental Status: Alert and oriented to person, place, and time.         Procedures          Assessment:   Assessment/Plan      Diagnoses and all orders for this visit:    1. Coronary artery disease involving native coronary artery " of native heart without angina pectoris (Primary)    2. Dyslipidemia    3. Primary hypertension    4. PVC's (premature ventricular contractions)      1.  Coronary artery disease, no angina.  Normal stress perfusion last week  2.  History of PVCs controlled on flecainide (placed by previous electrophysiologist) issues with cost, but is working well.  3.  Hypertension well-controlled on metoprolol  4.  Dyslipidemia on high intensity statin    Recommendations:  1.  Continue current medical therapy  2.  Offered patient perhaps a trial of another agent due to the cost issues.  As this 1 is working well, she is willing to simply continue at the higher co-pay.  3.  Revisit annually apparent symptom change    Cristi Jacobs MD             Dictated utilizing Dragon dictation

## 2022-05-19 DIAGNOSIS — Z00.00 HEALTHCARE MAINTENANCE: ICD-10-CM

## 2022-05-19 RX ORDER — FLECAINIDE ACETATE 50 MG/1
50 TABLET ORAL EVERY 12 HOURS
Qty: 180 TABLET | Refills: 3 | Status: SHIPPED | OUTPATIENT
Start: 2022-05-19 | End: 2022-06-29

## 2022-05-19 RX ORDER — ATORVASTATIN CALCIUM 40 MG/1
40 TABLET, FILM COATED ORAL
Qty: 90 TABLET | Refills: 3 | Status: SHIPPED | OUTPATIENT
Start: 2022-05-19

## 2022-05-19 RX ORDER — METOPROLOL SUCCINATE 25 MG/1
25 TABLET, EXTENDED RELEASE ORAL DAILY
Qty: 90 TABLET | Refills: 3 | Status: SHIPPED | OUTPATIENT
Start: 2022-05-19

## 2022-05-31 ENCOUNTER — TELEPHONE (OUTPATIENT)
Dept: CARDIOLOGY | Facility: CLINIC | Age: 72
End: 2022-05-31

## 2022-05-31 NOTE — TELEPHONE ENCOUNTER
Spoke with patient, she has a holter on currently ordered by Dr. Lopez, advised will have  call with next available.

## 2022-06-29 ENCOUNTER — OFFICE VISIT (OUTPATIENT)
Dept: CARDIOLOGY | Facility: CLINIC | Age: 72
End: 2022-06-29

## 2022-06-29 VITALS
SYSTOLIC BLOOD PRESSURE: 102 MMHG | OXYGEN SATURATION: 98 % | BODY MASS INDEX: 21.21 KG/M2 | WEIGHT: 132 LBS | HEART RATE: 68 BPM | DIASTOLIC BLOOD PRESSURE: 60 MMHG | HEIGHT: 66 IN

## 2022-06-29 DIAGNOSIS — I47.1 SUPRAVENTRICULAR TACHYCARDIA: ICD-10-CM

## 2022-06-29 DIAGNOSIS — E78.5 DYSLIPIDEMIA: ICD-10-CM

## 2022-06-29 DIAGNOSIS — I49.3 PVC'S (PREMATURE VENTRICULAR CONTRACTIONS): ICD-10-CM

## 2022-06-29 DIAGNOSIS — I25.10 CORONARY ARTERY DISEASE INVOLVING NATIVE CORONARY ARTERY OF NATIVE HEART WITHOUT ANGINA PECTORIS: Primary | ICD-10-CM

## 2022-06-29 PROCEDURE — 99214 OFFICE O/P EST MOD 30 MIN: CPT | Performed by: INTERNAL MEDICINE

## 2022-06-29 PROCEDURE — 93000 ELECTROCARDIOGRAM COMPLETE: CPT | Performed by: INTERNAL MEDICINE

## 2022-06-29 NOTE — PROGRESS NOTES
"Subjective:     Encounter Date:06/29/2022      Patient ID: Cierra López is a 72 y.o. female.    Chief Complaint: Follow-up, Dizziness, and Fall        PROBLEM LIST:  1.  Coronary artery disease:  a. [facility], 2013 for NSTEMI, felt secondary to SVT: 20% circumflex, 40% mid RCA, distal 50% RCA, 20% LAD.  Normal LVEF.  b. MPS, July 2015: Normal.  c. MPS, 6/25/2018: No evidence of ischemia. EF at rest 42% and 60% with stress.   d. 4/18/2022 MPS negative for ischemia.  EF greater than 70%.  2. History of supraventricular tachycardia, converted with adenosine.  3. PVC  a. On flecainide per EP Dr. Helen garvey. Echo, 6/25/2018: EF 65%, frequent PVC's during exam. There is no outflow obstruction or significant valvular disease.  c. MPS, 4/19/2022.  Normal  4. Hypertension.  5. Dyslipidemia.  6. GERD  7. Depression.  8. Remote tobacco abuse with cessation in 2014.  9. Diverticulitis:  a.  Status post left hemicolectomy.  10. Surgical history:   a. Tonsillectomy.  b. Appendectomy.  c. Hysterectomy.  d. Left lumpectomy.  e. Remote bladder surgery  f. Cataract  1.     History of Present Illness  Patient returns today for follow up with a history of PVCs SVT and nonflow obstructive coronary disease returns today for recurrent dizziness falls \"brain fog and I fall\".  Patient notes for \"quite some time\", likely years she has had progressive brain fog, and an eye visual changes.  She has not had some troubles with falling from this.  She thinks this started with her flecainide, and has progressively been worse.  She saw her primary physician who decreased her flecainide dose and one half, she notes her symptoms have improved though not resolved note.  She wore a monitor which per patient report looked okay, though the results we do not have yet.  She still has persistent symptoms.  Denies any presyncope or syncope.  Does have falls Vascepa bends over.  No chest pain or dyspnea on exertion.    No Known Allergies      Current " "Outpatient Medications:   •  aspirin 81 MG EC tablet, Take 81 mg by mouth Daily., Disp: , Rfl:   •  atorvastatin (LIPITOR) 40 MG tablet, Take 1 tablet by mouth every night at bedtime., Disp: 90 tablet, Rfl: 3  •  calcium-vitamin D (OSCAL) 250-125 MG-UNIT per tablet, Take 1 tablet by mouth Daily., Disp: , Rfl:   •  flecainide (TAMBOCOR) 50 MG tablet, Take 1 tablet by mouth Every 12 (Twelve) Hours. (Patient taking differently: Take 50 mg by mouth Daily.), Disp: 180 tablet, Rfl: 3  •  metoprolol succinate XL (TOPROL-XL) 25 MG 24 hr tablet, Take 1 tablet by mouth Daily., Disp: 90 tablet, Rfl: 3  •  Multiple Vitamins-Minerals (MULTIVITAMIN ADULT PO), Take 1 tablet by mouth Daily., Disp: , Rfl:   •  Omega-3 Fatty Acids (FISH OIL) 1000 MG capsule capsule, Take 1 capsule by mouth Daily With Breakfast., Disp: 90 capsule, Rfl: 3    The following portions of the patient's history were reviewed and updated as appropriate: allergies, current medications, past family history, past medical history, past social history, past surgical history and problem list.    ROS       Objective:   Blood pressure 102/60, pulse 68, height 167.6 cm (66\"), weight 59.9 kg (132 lb), SpO2 98 %.      Vitals reviewed.   Constitutional:       Appearance: Well-developed and not in distress.   Neck:      Thyroid: No thyromegaly.      Vascular: No carotid bruit or JVD.   Pulmonary:      Breath sounds: Normal breath sounds.   Cardiovascular:      Regular rhythm.      No gallop. No S3 and S4 gallop.   Abdominal:      General: Bowel sounds are normal.      Palpations: Abdomen is soft. There is no abdominal mass.      Tenderness: There is no abdominal tenderness.   Musculoskeletal:         General: No deformity.      Extremities: No clubbing present.Skin:     General: Skin is warm and dry.      Findings: No rash.   Neurological:      Mental Status: Alert and oriented to person, place, and time.         Lab Review:      ECG 12 Lead    Date/Time: 6/29/2022 10:44 " "AM  Performed by: Cristi Jacobs MD  Authorized by: Cristi Jacobs MD   Comparison: compared with previous ECG from 4/14/2021  Similar to previous ECG  Rhythm: sinus rhythm    Clinical impression: normal ECG                Assessment:   Diagnoses and all orders for this visit:    1. Coronary artery disease involving native coronary artery of native heart without angina pectoris (Primary)    2. Dyslipidemia    3. PVC's (premature ventricular contractions)    4. Supraventricular tachycardia (HCC)    Other orders  -     ECG 12 Lead        Impression  1. 1.  Nonflow obstructive CAD.  No angina  2. 2.  \"Brain fog\" falls dizziness, does not appear to be orthostatic by history.  3. History of SVT, 2013 presenting with non-STEMI.  No documented recurrence  4. Frequent PVCs for which she is on flecainide.  Patient thinks symptoms started with this, and is improved with decreasing dose.  5. 5.  Dyslipidemia on high intensity statin well-controlled    Plan:  11. Discontinue flecainide  12. If symptoms do not resolve, may need to decrease dose of beta-blocker given relative hypotension see if this helps improve symptoms  13. Discussed with patient, if she develops recurrent SVT and/or highly symptomatic PVCs, given that she is intolerant to the above medications, she would be best served by EP/ablative therapies.  14. Revisit in 4 MO, or sooner as needed.    Cristi Jacobs MD    "

## 2022-11-15 NOTE — PROGRESS NOTES
Subjective:     Encounter Date:11/16/2022    Primary Care Physician: Francis Lopez MD      Patient ID: Cierra López is a 72 y.o. female.    Chief Complaint:Follow-up and Cardiomyopathy    PROBLEM LIST:  1.  Coronary artery disease:  a. [facility], 2013 for NSTEMI, felt secondary to SVT: 20% circumflex, 40% mid RCA, distal 50% RCA, 20% LAD.  Normal LVEF.  b. MPS, July 2015: Normal.  c. MPS, 6/25/2018: No evidence of ischemia. EF at rest 42% and 60% with stress.   d. 4/18/2022 MPS negative for ischemia.  EF greater than 70%.  2. History of supraventricular tachycardia, converted with adenosine.  3. PVC  a. On flecainide per EP Dr. Helen garvey. Echo, 6/25/2018: EF 65%, frequent PVC's during exam. There is no outflow obstruction or significant valvular disease.  c. MPS, 4/19/2022.  Normal  4. Hypertension.  5. Dyslipidemia.  6. Lung cancer, stage IV.  Adenocarcinoma  1. Diagnosed 8/2022.  2. Followed at [facility] at   7. GERD  8. Depression.  9. Remote tobacco abuse with cessation in 2014.  10. Diverticulitis:  a.  Status post left hemicolectomy.  11. Surgical history:   a. Tonsillectomy.  b. Appendectomy.  c. Hysterectomy.  d. Left lumpectomy.  e. Remote bladder surgery  f. Cataract      No Known Allergies      Current Outpatient Medications:   •  aspirin 81 MG EC tablet, Take 81 mg by mouth Daily., Disp: , Rfl:   •  atorvastatin (LIPITOR) 40 MG tablet, Take 1 tablet by mouth every night at bedtime., Disp: 90 tablet, Rfl: 3  •  calcium-vitamin D (OSCAL) 250-125 MG-UNIT per tablet, Take 1 tablet by mouth Daily., Disp: , Rfl:   •  metoprolol succinate XL (TOPROL-XL) 25 MG 24 hr tablet, Take 1 tablet by mouth Daily., Disp: 90 tablet, Rfl: 3  •  Multiple Vitamins-Minerals (MULTIVITAMIN ADULT PO), Take 1 tablet by mouth Daily., Disp: , Rfl:   •  Omega-3 Fatty Acids (FISH OIL) 1000 MG capsule capsule, Take 1 capsule by mouth Daily With Breakfast., Disp: 90 capsule, Rfl: 3        History of Present  "Illness    Patient is a 72-year-old  female who is being seen today for follow-up of coronary artery disease, history of SVT, and PVCs.  At last office visit patient's flecainide was discontinued.  Since that time she notes to be doing better from that standpoint.  However unfortunately patient was diagnosed with stage IV lung cancer in August.  She is currently proceeding with chemotherapy and has 2 more rounds left.  She has lost roughly 20 pounds by our records since her last visit.  Overall, notes to be doing well and is in good spirits.  Denies any current cardiac complaints.    The following portions of the patient's history were reviewed and updated as appropriate: allergies, current medications, past family history, past medical history, past social history, past surgical history and problem list.      Social History     Tobacco Use   • Smoking status: Former     Types: Cigarettes     Quit date:      Years since quittin.8   • Smokeless tobacco: Never   Substance Use Topics   • Alcohol use: No   • Drug use: No         Review of Systems   Constitutional: Positive for malaise/fatigue.   Cardiovascular: Negative for chest pain, dyspnea on exertion, leg swelling, palpitations and syncope.   Respiratory: Negative.  Negative for shortness of breath.    Hematologic/Lymphatic: Negative for bleeding problem. Does not bruise/bleed easily.   Skin: Negative for rash.   Musculoskeletal: Positive for arthritis. Negative for muscle weakness and myalgias.   Gastrointestinal: Negative for heartburn, nausea and vomiting.   Neurological: Negative for dizziness, light-headedness, loss of balance and numbness.          Objective:   /58   Pulse 83   Ht 170.2 cm (67\")   Wt 51.1 kg (112 lb 9.6 oz)   SpO2 97%   BMI 17.64 kg/m²         Vitals reviewed.   Constitutional:       Appearance: Well-developed and not in distress.   Neck:      Vascular: No JVD.      Trachea: No tracheal deviation.   Pulmonary:      " Effort: Pulmonary effort is normal.      Breath sounds: Normal breath sounds.   Cardiovascular:      Normal rate. Regular rhythm.   Edema:     Peripheral edema absent.   Abdominal:      Palpations: Abdomen is soft.      Tenderness: There is no abdominal tenderness.   Musculoskeletal:         General: No deformity. Skin:     General: Skin is warm and dry.   Neurological:      Mental Status: Alert and oriented to person, place, and time.         Procedures          Assessment:   Assessment & Plan      Diagnoses and all orders for this visit:    1. Coronary artery disease involving native coronary artery of native heart without angina pectoris (Primary).  Stable.  No current angina.  On aspirin.    2. Dyslipidemia, stable.  On statin.  Labs with primary care.    3. PVC's (premature ventricular contractions), stable.  On beta-blocker.    4. Supraventricular tachycardia (HCC), stable.  On beta-blocker.      Plan:  1. Continue current cardiac medications.  2. Patient overall stable from cardiac standpoint.  3. Follow-up in 1 years time or sooner if needed.       Evi VIRGEN     Dictated utilizing Dragon dictation

## 2022-11-16 ENCOUNTER — OFFICE VISIT (OUTPATIENT)
Dept: CARDIOLOGY | Facility: CLINIC | Age: 72
End: 2022-11-16

## 2022-11-16 VITALS
OXYGEN SATURATION: 97 % | WEIGHT: 112.6 LBS | SYSTOLIC BLOOD PRESSURE: 115 MMHG | HEIGHT: 67 IN | HEART RATE: 83 BPM | DIASTOLIC BLOOD PRESSURE: 58 MMHG | BODY MASS INDEX: 17.67 KG/M2

## 2022-11-16 DIAGNOSIS — I49.3 PVC'S (PREMATURE VENTRICULAR CONTRACTIONS): ICD-10-CM

## 2022-11-16 DIAGNOSIS — I25.10 CORONARY ARTERY DISEASE INVOLVING NATIVE CORONARY ARTERY OF NATIVE HEART WITHOUT ANGINA PECTORIS: Primary | ICD-10-CM

## 2022-11-16 DIAGNOSIS — E78.5 DYSLIPIDEMIA: ICD-10-CM

## 2022-11-16 DIAGNOSIS — I47.1 SUPRAVENTRICULAR TACHYCARDIA: ICD-10-CM

## 2022-11-16 PROCEDURE — 99214 OFFICE O/P EST MOD 30 MIN: CPT | Performed by: NURSE PRACTITIONER

## 2023-11-28 NOTE — PROGRESS NOTES
Subjective:     Encounter Date:11/29/2023    Primary Care Physician: Francis Lopez MD      Patient ID: Cierra López is a 73 y.o. female.    Chief Complaint:No chief complaint on file.      PROBLEM LIST:   Coronary artery disease:  [facility], 2013 for NSTEMI, felt secondary to SVT: 20% circumflex, 40% mid RCA, distal 50% RCA, 20% LAD.  Normal LVEF.  MPS, July 2015: Normal.  MPS, 6/25/2018: No evidence of ischemia. EF at rest 42% and 60% with stress.   4/18/2022 MPS negative for ischemia.  EF greater than 70%.  History of supraventricular tachycardia, converted with adenosine.  PVC  On flecainide per EP Dr. Cervantes  Echo, 6/25/2018: EF 65%, frequent PVC's during exam. There is no outflow obstruction or significant valvular disease.  MPS, 4/19/2022.  Normal  Hypertension.  Dyslipidemia.  Lung cancer, stage IV.  Adenocarcinoma  Diagnosed 8/2022.  Followed at [facility] at   GERD  Depression.  Remote tobacco abuse with cessation in 2014.  Diverticulitis:   Status post left hemicolectomy.  Surgical history:   Tonsillectomy.  Appendectomy.  Hysterectomy.  Left lumpectomy.  Remote bladder surgery  Cataract        No Known Allergies      Current Outpatient Medications:     aspirin 81 MG EC tablet, Take 81 mg by mouth Daily., Disp: , Rfl:     atorvastatin (LIPITOR) 40 MG tablet, Take 1 tablet by mouth every night at bedtime., Disp: 90 tablet, Rfl: 3    calcium-vitamin D (OSCAL) 250-125 MG-UNIT per tablet, Take 1 tablet by mouth Daily., Disp: , Rfl:     metoprolol succinate XL (TOPROL-XL) 25 MG 24 hr tablet, Take 1 tablet by mouth Daily., Disp: 90 tablet, Rfl: 3    Multiple Vitamins-Minerals (MULTIVITAMIN ADULT PO), Take 1 tablet by mouth Daily., Disp: , Rfl:     Omega-3 Fatty Acids (FISH OIL) 1000 MG capsule capsule, Take 1 capsule by mouth Daily With Breakfast., Disp: 90 capsule, Rfl: 3        History of Present Illness    Patient returns today for routine follow-up coronary disease.  Since her last visit, she  has had progression of her stage IV adenocarcinoma, and is currently on hospice.  She has continue to lose weight, has poor appetite, but is in good spirits.  Denies chest pain orthopnea PND or peripheral edema.  No syncope or presyncope.    The following portions of the patient's history were reviewed and updated as appropriate: allergies, current medications, past family history, past medical history, past social history, past surgical history and problem list.      Social History     Tobacco Use    Smoking status: Former     Types: Cigarettes     Quit date:      Years since quittin.9    Smokeless tobacco: Never   Substance Use Topics    Alcohol use: No    Drug use: No         ROS       Objective:   There were no vitals taken for this visit.        Vitals reviewed.   Constitutional:       Appearance: Well-developed and not in distress. Cachectic and frail.   Neck:      Thyroid: No thyromegaly.      Vascular: No carotid bruit or JVD.   Pulmonary:      Breath sounds: Normal breath sounds.   Cardiovascular:      Regular rhythm.      No gallop. No S3 and S4 gallop.   Abdominal:      General: Bowel sounds are normal.      Palpations: Abdomen is soft. There is no abdominal mass.      Tenderness: There is no abdominal tenderness.   Musculoskeletal:         General: No deformity.      Extremities: No clubbing present.Skin:     General: Skin is warm and dry.      Findings: No rash.   Neurological:      Mental Status: Alert and oriented to person, place, and time.         Procedures          Assessment:   Assessment & Plan      Diagnoses and all orders for this visit:    1. Coronary artery disease involving native coronary artery of native heart without angina pectoris (Primary)      1.  Coronary artery disease, status post remote PCI's.  Currently stable without angina.  2.  Stage IV adenocarcinoma, progressive.  3.  Hypertension controlled, on beta-blocker  4.  History of SVT, and frequent PVCs, controlled on  beta-blocker  5.  Dyslipidemia unclear if she still on statin at this time.    Recommendations:  1.  Discussed with patient, given her terminal illness, and severe cachexia, that she has no dietary restrictions, and she may discontinue her atorvastatin.  2.  Her blood pressure is okay despite the weight loss, and we will continue her metoprolol to help prevent recurrent symptomatic palpitations/SVT as she had in the past.  She understands if she develops some dizziness or lightheadedness we may be able to discontinue this.  3.  Revisit with us on a as needed basis.         Advance Care Planning   ACP discussion was held with the patient during this visit. Patient has an advance directive in EMR which is still valid.       Cristi Jacobs MD    Dictated utilizing Dragon dictation

## 2023-11-29 ENCOUNTER — OFFICE VISIT (OUTPATIENT)
Dept: CARDIOLOGY | Facility: CLINIC | Age: 73
End: 2023-11-29
Payer: COMMERCIAL

## 2023-11-29 VITALS
DIASTOLIC BLOOD PRESSURE: 86 MMHG | WEIGHT: 90.8 LBS | OXYGEN SATURATION: 96 % | BODY MASS INDEX: 14.25 KG/M2 | HEIGHT: 67 IN | HEART RATE: 120 BPM | SYSTOLIC BLOOD PRESSURE: 136 MMHG

## 2023-11-29 DIAGNOSIS — I25.10 CORONARY ARTERY DISEASE INVOLVING NATIVE CORONARY ARTERY OF NATIVE HEART WITHOUT ANGINA PECTORIS: Primary | ICD-10-CM

## 2023-11-29 PROCEDURE — 99213 OFFICE O/P EST LOW 20 MIN: CPT | Performed by: INTERNAL MEDICINE

## 2023-11-29 RX ORDER — FENTANYL 25 UG/1
PATCH TRANSDERMAL
COMMUNITY

## 2023-11-29 RX ORDER — HYDROCODONE BITARTRATE AND ACETAMINOPHEN 5; 325 MG/1; MG/1
TABLET ORAL EVERY 6 HOURS PRN
COMMUNITY
Start: 2023-09-22

## 2023-11-29 RX ORDER — FENTANYL 12.5 UG/1
PATCH TRANSDERMAL
COMMUNITY
Start: 2023-11-24

## 2023-11-29 RX ORDER — IPRATROPIUM BROMIDE 21 UG/1
SPRAY, METERED NASAL AS NEEDED
COMMUNITY
Start: 2023-08-28

## 2023-11-29 RX ORDER — FLUTICASONE PROPIONATE 50 MCG
SPRAY, SUSPENSION (ML) NASAL
COMMUNITY
Start: 2023-06-07

## 2023-11-29 RX ORDER — ALBUTEROL SULFATE 90 UG/1
1 AEROSOL, METERED RESPIRATORY (INHALATION) 2 TIMES DAILY
COMMUNITY
Start: 2023-09-24

## 2023-11-29 RX ORDER — ONDANSETRON HYDROCHLORIDE 8 MG/1
TABLET, FILM COATED ORAL
COMMUNITY